# Patient Record
Sex: FEMALE | Race: WHITE | URBAN - METROPOLITAN AREA
[De-identification: names, ages, dates, MRNs, and addresses within clinical notes are randomized per-mention and may not be internally consistent; named-entity substitution may affect disease eponyms.]

---

## 2019-05-05 ENCOUNTER — EMERGENCY (EMERGENCY)
Facility: HOSPITAL | Age: 54
LOS: 0 days | Discharge: HOME | End: 2019-05-05
Attending: EMERGENCY MEDICINE | Admitting: EMERGENCY MEDICINE
Payer: MEDICARE

## 2019-05-05 ENCOUNTER — TRANSCRIPTION ENCOUNTER (OUTPATIENT)
Age: 54
End: 2019-05-05

## 2019-05-05 VITALS
TEMPERATURE: 98 F | HEART RATE: 68 BPM | SYSTOLIC BLOOD PRESSURE: 133 MMHG | RESPIRATION RATE: 16 BRPM | DIASTOLIC BLOOD PRESSURE: 64 MMHG | OXYGEN SATURATION: 100 %

## 2019-05-05 VITALS
SYSTOLIC BLOOD PRESSURE: 110 MMHG | DIASTOLIC BLOOD PRESSURE: 70 MMHG | HEART RATE: 64 BPM | RESPIRATION RATE: 17 BRPM | OXYGEN SATURATION: 99 % | TEMPERATURE: 98 F

## 2019-05-05 DIAGNOSIS — R14.0 ABDOMINAL DISTENSION (GASEOUS): ICD-10-CM

## 2019-05-05 DIAGNOSIS — Z90.49 ACQUIRED ABSENCE OF OTHER SPECIFIED PARTS OF DIGESTIVE TRACT: ICD-10-CM

## 2019-05-05 DIAGNOSIS — K59.00 CONSTIPATION, UNSPECIFIED: ICD-10-CM

## 2019-05-05 DIAGNOSIS — Z79.899 OTHER LONG TERM (CURRENT) DRUG THERAPY: ICD-10-CM

## 2019-05-05 DIAGNOSIS — Z90.49 ACQUIRED ABSENCE OF OTHER SPECIFIED PARTS OF DIGESTIVE TRACT: Chronic | ICD-10-CM

## 2019-05-05 DIAGNOSIS — R10.9 UNSPECIFIED ABDOMINAL PAIN: ICD-10-CM

## 2019-05-05 DIAGNOSIS — R10.32 LEFT LOWER QUADRANT PAIN: ICD-10-CM

## 2019-05-05 LAB
ALBUMIN SERPL ELPH-MCNC: 4.3 G/DL — SIGNIFICANT CHANGE UP (ref 3.5–5.2)
ALP SERPL-CCNC: 68 U/L — SIGNIFICANT CHANGE UP (ref 30–115)
ALT FLD-CCNC: 25 U/L — SIGNIFICANT CHANGE UP (ref 0–41)
ANION GAP SERPL CALC-SCNC: 11 MMOL/L — SIGNIFICANT CHANGE UP (ref 7–14)
APPEARANCE UR: CLEAR — SIGNIFICANT CHANGE UP
AST SERPL-CCNC: 27 U/L — SIGNIFICANT CHANGE UP (ref 0–41)
BASOPHILS # BLD AUTO: 0.04 K/UL — SIGNIFICANT CHANGE UP (ref 0–0.2)
BASOPHILS NFR BLD AUTO: 0.6 % — SIGNIFICANT CHANGE UP (ref 0–1)
BILIRUB SERPL-MCNC: 0.4 MG/DL — SIGNIFICANT CHANGE UP (ref 0.2–1.2)
BILIRUB UR-MCNC: NEGATIVE — SIGNIFICANT CHANGE UP
BUN SERPL-MCNC: 14 MG/DL — SIGNIFICANT CHANGE UP (ref 10–20)
CALCIUM SERPL-MCNC: 10.1 MG/DL — SIGNIFICANT CHANGE UP (ref 8.5–10.1)
CHLORIDE SERPL-SCNC: 102 MMOL/L — SIGNIFICANT CHANGE UP (ref 98–110)
CO2 SERPL-SCNC: 28 MMOL/L — SIGNIFICANT CHANGE UP (ref 17–32)
COLOR SPEC: YELLOW — SIGNIFICANT CHANGE UP
CREAT SERPL-MCNC: 0.9 MG/DL — SIGNIFICANT CHANGE UP (ref 0.7–1.5)
DIFF PNL FLD: ABNORMAL
EOSINOPHIL # BLD AUTO: 0.17 K/UL — SIGNIFICANT CHANGE UP (ref 0–0.7)
EOSINOPHIL NFR BLD AUTO: 2.6 % — SIGNIFICANT CHANGE UP (ref 0–8)
GLUCOSE SERPL-MCNC: 87 MG/DL — SIGNIFICANT CHANGE UP (ref 70–99)
GLUCOSE UR QL: NEGATIVE MG/DL — SIGNIFICANT CHANGE UP
HCT VFR BLD CALC: 40.2 % — SIGNIFICANT CHANGE UP (ref 37–47)
HGB BLD-MCNC: 13.5 G/DL — SIGNIFICANT CHANGE UP (ref 12–16)
IMM GRANULOCYTES NFR BLD AUTO: 0.3 % — SIGNIFICANT CHANGE UP (ref 0.1–0.3)
KETONES UR-MCNC: 15
LEUKOCYTE ESTERASE UR-ACNC: ABNORMAL
LIDOCAIN IGE QN: 43 U/L — SIGNIFICANT CHANGE UP (ref 7–60)
LYMPHOCYTES # BLD AUTO: 1.77 K/UL — SIGNIFICANT CHANGE UP (ref 1.2–3.4)
LYMPHOCYTES # BLD AUTO: 27.2 % — SIGNIFICANT CHANGE UP (ref 20.5–51.1)
MCHC RBC-ENTMCNC: 30.7 PG — SIGNIFICANT CHANGE UP (ref 27–31)
MCHC RBC-ENTMCNC: 33.6 G/DL — SIGNIFICANT CHANGE UP (ref 32–37)
MCV RBC AUTO: 91.4 FL — SIGNIFICANT CHANGE UP (ref 81–99)
MONOCYTES # BLD AUTO: 0.45 K/UL — SIGNIFICANT CHANGE UP (ref 0.1–0.6)
MONOCYTES NFR BLD AUTO: 6.9 % — SIGNIFICANT CHANGE UP (ref 1.7–9.3)
NEUTROPHILS # BLD AUTO: 4.05 K/UL — SIGNIFICANT CHANGE UP (ref 1.4–6.5)
NEUTROPHILS NFR BLD AUTO: 62.4 % — SIGNIFICANT CHANGE UP (ref 42.2–75.2)
NITRITE UR-MCNC: NEGATIVE — SIGNIFICANT CHANGE UP
NRBC # BLD: 0 /100 WBCS — SIGNIFICANT CHANGE UP (ref 0–0)
PH UR: 5.5 — SIGNIFICANT CHANGE UP (ref 5–8)
PLATELET # BLD AUTO: 203 K/UL — SIGNIFICANT CHANGE UP (ref 130–400)
POTASSIUM SERPL-MCNC: 4 MMOL/L — SIGNIFICANT CHANGE UP (ref 3.5–5)
POTASSIUM SERPL-SCNC: 4 MMOL/L — SIGNIFICANT CHANGE UP (ref 3.5–5)
PROT SERPL-MCNC: 6.9 G/DL — SIGNIFICANT CHANGE UP (ref 6–8)
PROT UR-MCNC: NEGATIVE MG/DL — SIGNIFICANT CHANGE UP
RBC # BLD: 4.4 M/UL — SIGNIFICANT CHANGE UP (ref 4.2–5.4)
RBC # FLD: 12.6 % — SIGNIFICANT CHANGE UP (ref 11.5–14.5)
SODIUM SERPL-SCNC: 141 MMOL/L — SIGNIFICANT CHANGE UP (ref 135–146)
SP GR SPEC: 1.02 — SIGNIFICANT CHANGE UP (ref 1.01–1.03)
UROBILINOGEN FLD QL: 0.2 MG/DL — SIGNIFICANT CHANGE UP (ref 0.2–0.2)
WBC # BLD: 6.5 K/UL — SIGNIFICANT CHANGE UP (ref 4.8–10.8)
WBC # FLD AUTO: 6.5 K/UL — SIGNIFICANT CHANGE UP (ref 4.8–10.8)

## 2019-05-05 PROCEDURE — 74177 CT ABD & PELVIS W/CONTRAST: CPT | Mod: 26

## 2019-05-05 PROCEDURE — 99284 EMERGENCY DEPT VISIT MOD MDM: CPT

## 2019-05-05 RX ORDER — IOHEXOL 300 MG/ML
30 INJECTION, SOLUTION INTRAVENOUS ONCE
Qty: 0 | Refills: 0 | Status: COMPLETED | OUTPATIENT
Start: 2019-05-05 | End: 2019-05-05

## 2019-05-05 RX ORDER — SODIUM CHLORIDE 9 MG/ML
1000 INJECTION, SOLUTION INTRAVENOUS ONCE
Qty: 0 | Refills: 0 | Status: COMPLETED | OUTPATIENT
Start: 2019-05-05 | End: 2019-05-05

## 2019-05-05 RX ORDER — MULTIVIT WITH MIN/MFOLATE/K2 340-15/3 G
1 POWDER (GRAM) ORAL ONCE
Qty: 0 | Refills: 0 | Status: COMPLETED | OUTPATIENT
Start: 2019-05-05 | End: 2019-05-05

## 2019-05-05 RX ADMIN — IOHEXOL 30 MILLILITER(S): 300 INJECTION, SOLUTION INTRAVENOUS at 17:15

## 2019-05-05 RX ADMIN — Medication 1 ENEMA: at 22:05

## 2019-05-05 RX ADMIN — SODIUM CHLORIDE 1000 MILLILITER(S): 9 INJECTION, SOLUTION INTRAVENOUS at 16:41

## 2019-05-05 RX ADMIN — Medication 1 BOTTLE: at 22:05

## 2019-05-05 RX ADMIN — SODIUM CHLORIDE 1000 MILLILITER(S): 9 INJECTION, SOLUTION INTRAVENOUS at 19:26

## 2019-05-05 NOTE — ED ADULT NURSE NOTE - NSIMPLEMENTINTERV_GEN_ALL_ED
Implemented All Universal Safety Interventions:  [place] to call system. Call bell, personal items and telephone within reach. Instruct patient to call for assistance. Room bathroom lighting operational. Non-slip footwear when patient is off stretcher. Physically safe environment: no spills, clutter or unnecessary equipment. Stretcher in lowest position, wheels locked, appropriate side rails in place.

## 2019-05-05 NOTE — ED PROVIDER NOTE - PHYSICAL EXAMINATION
Gen: NAD, AOx3  Head: NCAT  HEENT: PERRL, oral mucosa moist, normal conjunctiva, oropharynx clear without exudate or erythema  Lung: CTAB, no respiratory distress, no wheezing, rales, rhonchi  CV: normal s1/s2, rrr, Normal perfusion, pulses 2+ throughout  Abd: no CVA tenderness, distention, LLQ tendernes  Genitourinary: no pelvic tenderness  MSK: No edema, no visible deformities, full range of motion in all 4 extremities  Neuro: CN II-XII grossly intact, No focal neurologic deficits  Skin: No rash   Psych: normal affect

## 2019-05-05 NOTE — ED PROVIDER NOTE - NSFOLLOWUPINSTRUCTIONS_ED_ALL_ED_FT
Constipation, Adult  Constipation is when a person:  Poops (has a bowel movement) fewer times in a week than normal.  Has a hard time pooping.  Has poop that is dry, hard, or bigger than normal.  Follow these instructions at home:  Eating and drinking     Image   Eat foods that have a lot of fiber, such as:  Fresh fruits and vegetables.  Whole grains.  Beans.  Eat less of foods that are high in fat, low in fiber, or overly processed, such as:  French fries.  Hamburgers.  Cookies.  Candy.  Soda.  Drink enough fluid to keep your pee (urine) clear or pale yellow.  General instructions     Exercise regularly or as told by your doctor.  Go to the restroom when you feel like you need to poop. Do not hold it in.  Take over-the-counter and prescription medicines only as told by your doctor. These include any fiber supplements.  Do pelvic floor retraining exercises, such as:  Doing deep breathing while relaxing your lower belly (abdomen).  Relaxing your pelvic floor while pooping.  Watch your condition for any changes.  Keep all follow-up visits as told by your doctor. This is important.  Contact a doctor if:  You have pain that gets worse.  You have a fever.  You have not pooped for 4 days.  You throw up (vomit).  You are not hungry.  You lose weight.  You are bleeding from the anus.  You have thin, pencil-like poop (stool).  Get help right away if:  You have a fever, and your symptoms suddenly get worse.  You leak poop or have blood in your poop.  Your belly feels hard or bigger than normal (is bloated).  You have very bad belly pain.  You feel dizzy or you faint.  This information is not intended to replace advice given to you by your health care provider. Make sure you discuss any questions you have with your primary care physician and gastroenterologist.

## 2019-05-05 NOTE — ED PROVIDER NOTE - NS ED ROS FT
Constitutional: (-) fever  Eyes/ENT: (-) blurry vision, (-) epistaxis  Cardiovascular: (-) chest pain, (-) syncope  Respiratory: (-) cough, (-) shortness of breath  Gastrointestinal: (-) vomiting, (-) diarrhea, constipation, abdominal pain  Genitourinary: (-) dysuria, (-) hesitancy, (-) frequency   Musculoskeletal: (-) neck pain, (-) back pain, (-) joint pain  Integumentary: (-) rash, (-) edema  Neurological: (-) headache, (-) altered mental status  Allergic/Immunologic: (-) pruritus

## 2019-05-05 NOTE — ED PROVIDER NOTE - PROGRESS NOTE DETAILS
54yo F with a h/o MS and constipation comes in c.o abdominal pain with constipation x 10 days. Pt went to [facility] and was sent to the ED for further eval. At the [facility] an [place]R was done and it was noted showing multiple air fluid levels in the right colon. Pt states that she has taken multiple laxatives with no relief. PT denies any CP, SOB, nausea, vomiting, urinary symptoms. On exam: NCAT. PERRLA, EOMI. OP clear. Lungs CTAB. RRR, S1S2 noted. Radial pulses 2+ and equal, pedal pulses 2+ and equal. Abdomen soft, NT, (+) distention, no rebound or guarding. FROM x4 extremities. No focal neuro deficits. Plan: CT, labs, Fluids and reassess ct was unremarkable. will order enema and magnesium citrate. have pt f/u with her gastroenterologist. Patient to be discharged from ED. Any available test results were discussed with patient and/or family. Verbal instructions given, including instructions to return to ED immediately for any new, worsening, or concerning symptoms. Patient endorsed understanding. Written discharge instructions additionally given, including follow-up plan.

## 2019-05-05 NOTE — ED PROVIDER NOTE - ATTENDING CONTRIBUTION TO CARE
54yo F with a h/o MS and constipation comes in c.o abdominal pain with constipation x 10 days. Pt went to [facility] [place] and was sent to the ED for further eval. At the [facility] an XR was done and it was noted showing multiple air fluid levels in the right colon. Pt states that she has taken multiple laxatives with no relief. PT denies any CP, SOB, nausea, vomiting, urinary symptoms. On exam: NCAT. PERRLA, EOMI. OP clear. Lungs CTAB. RRR, S1S2 noted. Radial pulses 2+ and equal, pedal pulses 2+ and equal. Abdomen soft, NT, (+) distention, no rebound or guarding. FROM x4 extremities. No focal neuro deficits. Plan: CT, labs, Fluids and reassess

## 2019-05-05 NOTE — ED PROVIDER NOTE - OBJECTIVE STATEMENT
54 yo female with a pmh of MS present c/o constipation. pt states she has not been able to pass her bowels for 10 days. she has taken laxatives and suppository enemas with no relief. she has been experiencing bloating that has gradually worsened throughout the days and she started to have lower abdominal pain last night that radiates to her back. she describes the pain as pressure in nature and has not noted any aggravating or alleviating factors. she denies any other symptoms including recent illness/travel, fevers, chills, cough, chest pain, or SOB.

## 2019-05-19 ENCOUNTER — EMERGENCY (EMERGENCY)
Facility: HOSPITAL | Age: 54
LOS: 0 days | Discharge: HOME | End: 2019-05-19
Attending: EMERGENCY MEDICINE | Admitting: EMERGENCY MEDICINE
Payer: MEDICARE

## 2019-05-19 VITALS
OXYGEN SATURATION: 99 % | DIASTOLIC BLOOD PRESSURE: 62 MMHG | WEIGHT: 139.99 LBS | RESPIRATION RATE: 16 BRPM | SYSTOLIC BLOOD PRESSURE: 121 MMHG | TEMPERATURE: 98 F | HEART RATE: 55 BPM

## 2019-05-19 DIAGNOSIS — Z90.49 ACQUIRED ABSENCE OF OTHER SPECIFIED PARTS OF DIGESTIVE TRACT: Chronic | ICD-10-CM

## 2019-05-19 DIAGNOSIS — R10.9 UNSPECIFIED ABDOMINAL PAIN: ICD-10-CM

## 2019-05-19 DIAGNOSIS — K59.00 CONSTIPATION, UNSPECIFIED: ICD-10-CM

## 2019-05-19 DIAGNOSIS — Z79.899 OTHER LONG TERM (CURRENT) DRUG THERAPY: ICD-10-CM

## 2019-05-19 DIAGNOSIS — Z90.49 ACQUIRED ABSENCE OF OTHER SPECIFIED PARTS OF DIGESTIVE TRACT: ICD-10-CM

## 2019-05-19 LAB
ALBUMIN SERPL ELPH-MCNC: 4.2 G/DL — SIGNIFICANT CHANGE UP (ref 3.5–5.2)
ALP SERPL-CCNC: 62 U/L — SIGNIFICANT CHANGE UP (ref 30–115)
ALT FLD-CCNC: 30 U/L — SIGNIFICANT CHANGE UP (ref 0–41)
ANION GAP SERPL CALC-SCNC: 10 MMOL/L — SIGNIFICANT CHANGE UP (ref 7–14)
APPEARANCE UR: CLEAR — SIGNIFICANT CHANGE UP
AST SERPL-CCNC: 42 U/L — HIGH (ref 0–41)
BASOPHILS # BLD AUTO: 0.03 K/UL — SIGNIFICANT CHANGE UP (ref 0–0.2)
BASOPHILS NFR BLD AUTO: 0.4 % — SIGNIFICANT CHANGE UP (ref 0–1)
BILIRUB SERPL-MCNC: 0.3 MG/DL — SIGNIFICANT CHANGE UP (ref 0.2–1.2)
BILIRUB UR-MCNC: NEGATIVE — SIGNIFICANT CHANGE UP
BUN SERPL-MCNC: 15 MG/DL — SIGNIFICANT CHANGE UP (ref 10–20)
CALCIUM SERPL-MCNC: 9.7 MG/DL — SIGNIFICANT CHANGE UP (ref 8.5–10.1)
CHLORIDE SERPL-SCNC: 107 MMOL/L — SIGNIFICANT CHANGE UP (ref 98–110)
CO2 SERPL-SCNC: 28 MMOL/L — SIGNIFICANT CHANGE UP (ref 17–32)
COLOR SPEC: YELLOW — SIGNIFICANT CHANGE UP
CREAT SERPL-MCNC: 0.8 MG/DL — SIGNIFICANT CHANGE UP (ref 0.7–1.5)
DIFF PNL FLD: NEGATIVE — SIGNIFICANT CHANGE UP
EOSINOPHIL # BLD AUTO: 0.18 K/UL — SIGNIFICANT CHANGE UP (ref 0–0.7)
EOSINOPHIL NFR BLD AUTO: 2.6 % — SIGNIFICANT CHANGE UP (ref 0–8)
EPI CELLS # UR: ABNORMAL /HPF
GLUCOSE SERPL-MCNC: 94 MG/DL — SIGNIFICANT CHANGE UP (ref 70–99)
GLUCOSE UR QL: NEGATIVE MG/DL — SIGNIFICANT CHANGE UP
HCT VFR BLD CALC: 40.4 % — SIGNIFICANT CHANGE UP (ref 37–47)
HGB BLD-MCNC: 13.4 G/DL — SIGNIFICANT CHANGE UP (ref 12–16)
IMM GRANULOCYTES NFR BLD AUTO: 0.3 % — SIGNIFICANT CHANGE UP (ref 0.1–0.3)
KETONES UR-MCNC: ABNORMAL
LEUKOCYTE ESTERASE UR-ACNC: ABNORMAL
LIDOCAIN IGE QN: 47 U/L — SIGNIFICANT CHANGE UP (ref 7–60)
LYMPHOCYTES # BLD AUTO: 2.32 K/UL — SIGNIFICANT CHANGE UP (ref 1.2–3.4)
LYMPHOCYTES # BLD AUTO: 33.2 % — SIGNIFICANT CHANGE UP (ref 20.5–51.1)
MAGNESIUM SERPL-MCNC: 2.1 MG/DL — SIGNIFICANT CHANGE UP (ref 1.8–2.4)
MCHC RBC-ENTMCNC: 30.3 PG — SIGNIFICANT CHANGE UP (ref 27–31)
MCHC RBC-ENTMCNC: 33.2 G/DL — SIGNIFICANT CHANGE UP (ref 32–37)
MCV RBC AUTO: 91.4 FL — SIGNIFICANT CHANGE UP (ref 81–99)
MONOCYTES # BLD AUTO: 0.64 K/UL — HIGH (ref 0.1–0.6)
MONOCYTES NFR BLD AUTO: 9.2 % — SIGNIFICANT CHANGE UP (ref 1.7–9.3)
NEUTROPHILS # BLD AUTO: 3.79 K/UL — SIGNIFICANT CHANGE UP (ref 1.4–6.5)
NEUTROPHILS NFR BLD AUTO: 54.3 % — SIGNIFICANT CHANGE UP (ref 42.2–75.2)
NITRITE UR-MCNC: NEGATIVE — SIGNIFICANT CHANGE UP
NRBC # BLD: 0 /100 WBCS — SIGNIFICANT CHANGE UP (ref 0–0)
PH UR: 6 — SIGNIFICANT CHANGE UP (ref 5–8)
PLATELET # BLD AUTO: 225 K/UL — SIGNIFICANT CHANGE UP (ref 130–400)
POTASSIUM SERPL-MCNC: 4.4 MMOL/L — SIGNIFICANT CHANGE UP (ref 3.5–5)
POTASSIUM SERPL-SCNC: 4.4 MMOL/L — SIGNIFICANT CHANGE UP (ref 3.5–5)
PROT SERPL-MCNC: 7.1 G/DL — SIGNIFICANT CHANGE UP (ref 6–8)
PROT UR-MCNC: NEGATIVE MG/DL — SIGNIFICANT CHANGE UP
RBC # BLD: 4.42 M/UL — SIGNIFICANT CHANGE UP (ref 4.2–5.4)
RBC # FLD: 12.8 % — SIGNIFICANT CHANGE UP (ref 11.5–14.5)
RBC CASTS # UR COMP ASSIST: SIGNIFICANT CHANGE UP /HPF
SODIUM SERPL-SCNC: 145 MMOL/L — SIGNIFICANT CHANGE UP (ref 135–146)
SP GR SPEC: 1.02 — SIGNIFICANT CHANGE UP (ref 1.01–1.03)
UROBILINOGEN FLD QL: 0.2 MG/DL — SIGNIFICANT CHANGE UP (ref 0.2–0.2)
WBC # BLD: 6.98 K/UL — SIGNIFICANT CHANGE UP (ref 4.8–10.8)
WBC # FLD AUTO: 6.98 K/UL — SIGNIFICANT CHANGE UP (ref 4.8–10.8)
WBC UR QL: ABNORMAL /HPF

## 2019-05-19 PROCEDURE — 74177 CT ABD & PELVIS W/CONTRAST: CPT | Mod: 26

## 2019-05-19 PROCEDURE — 99284 EMERGENCY DEPT VISIT MOD MDM: CPT

## 2019-05-19 RX ORDER — SODIUM CHLORIDE 9 MG/ML
1000 INJECTION INTRAMUSCULAR; INTRAVENOUS; SUBCUTANEOUS ONCE
Refills: 0 | Status: COMPLETED | OUTPATIENT
Start: 2019-05-19 | End: 2019-05-19

## 2019-05-19 RX ORDER — IOHEXOL 300 MG/ML
30 INJECTION, SOLUTION INTRAVENOUS ONCE
Refills: 0 | Status: COMPLETED | OUTPATIENT
Start: 2019-05-19 | End: 2019-05-19

## 2019-05-19 RX ORDER — ONDANSETRON 8 MG/1
4 TABLET, FILM COATED ORAL ONCE
Refills: 0 | Status: COMPLETED | OUTPATIENT
Start: 2019-05-19 | End: 2019-05-19

## 2019-05-19 RX ADMIN — IOHEXOL 30 MILLILITER(S): 300 INJECTION, SOLUTION INTRAVENOUS at 19:27

## 2019-05-19 RX ADMIN — ONDANSETRON 4 MILLIGRAM(S): 8 TABLET, FILM COATED ORAL at 21:28

## 2019-05-19 RX ADMIN — SODIUM CHLORIDE 2000 MILLILITER(S): 9 INJECTION INTRAMUSCULAR; INTRAVENOUS; SUBCUTANEOUS at 19:19

## 2019-05-19 NOTE — ED PROVIDER NOTE - OBJECTIVE STATEMENT
54 y/o female with hx of MS , chronic constipation, taking laxatives daily x years presents with constipation . patient stopped taking her laxatives 3 weeks ago. patient with abdominal distention and cramping 52 y/o female with hx of MS , chronic constipation, taking laxatives daily x years presents with constipation . patient stopped taking her laxatives 3 weeks ago. patient with abdominal distention and cramping. pt seen in the Ed 2 weeks ago, had ct scn and given laxtives. patient states she took 3 enemas today and only had small bowel movement. patient c/o pressure in central abdomen radiating to back

## 2019-05-19 NOTE — ED PROVIDER NOTE - PROGRESS NOTE DETAILS
54yo F with a h/o MS presents with constipation x 3 weeks. Pt was recently seen in the ER for the same symptoms and states that her constipation has not gotten better. Pt state sthat she has been taking multiple different laxatives with no relief of symptoms. On exam: NCAT. PERRLA, EOMI. OP clear. Lungs CTAB. RRR, S1S2 noted. Radial pulses 2+ and equal, pedal pulses 2+ and equal. Abdomen soft, NT, (+) distention, no rebound or guarding. FROM x4 extremities. No focal neuro deficits.

## 2019-05-19 NOTE — ED ADULT TRIAGE NOTE - CHIEF COMPLAINT QUOTE
"I feel bloated. I am constipated. I am passing very small pieces of stool." Pt states she has tried numerous OTC remedies (suppositories, mineral oil, three to four enemas per day) with no relief. Pt seen here two weeks ago for similar symptoms.

## 2019-05-19 NOTE — ED PROVIDER NOTE - NS ED ROS FT
Review of Systems    Constitutional: (-) fever/ chills (-) weight loss  Eyes/ENT: (-) blurry vision  Cardiovascular: (-) chest pain, (-) syncope (-) palpitations  Respiratory: (-) cough, (-) shortness of breath  Gastrointestinal: (-) vomiting, (-) diarrhea (+) abdominal pain and distention  Musculoskeletal: (-) neck pain, (-) back pain,   Integumentary: (-) rash, (-) swelling  Neurological: (-) headache,   Psychiatric: (-) hallucinations or depression   Allergic/Immunologic: (-) pruritus

## 2019-05-19 NOTE — ED PROVIDER NOTE - ATTENDING CONTRIBUTION TO CARE
I was present for and supervised the key and critical aspects of the procedures performed during the care of the patient. 54yo F with a h/o MS presents with constipation x 3 weeks. Pt was recently seen in the ER for the same symptoms and states that her constipation has not gotten better. Pt state sthat she has been taking multiple different laxatives with no relief of symptoms. On exam: NCAT. PERRLA, EOMI. OP clear. Lungs CTAB. RRR, S1S2 noted. Radial pulses 2+ and equal, pedal pulses 2+ and equal. Abdomen soft, NT, (+) distention, no rebound or guarding. FROM x4 extremities. No focal neuro deficits.

## 2019-05-19 NOTE — ED PROVIDER NOTE - CLINICAL SUMMARY MEDICAL DECISION MAKING FREE TEXT BOX
Patient improved we obtained labs, ct abd pelvis which is negative I will discharge at this time follow up 5/30

## 2019-05-20 VITALS
DIASTOLIC BLOOD PRESSURE: 60 MMHG | SYSTOLIC BLOOD PRESSURE: 119 MMHG | OXYGEN SATURATION: 99 % | HEART RATE: 64 BPM | RESPIRATION RATE: 16 BRPM

## 2019-05-20 PROBLEM — G35 MULTIPLE SCLEROSIS: Chronic | Status: ACTIVE | Noted: 2019-05-05

## 2019-05-20 PROBLEM — K59.00 CONSTIPATION, UNSPECIFIED: Chronic | Status: ACTIVE | Noted: 2019-05-05

## 2019-08-05 ENCOUNTER — APPOINTMENT (OUTPATIENT)
Dept: NEUROLOGY | Facility: CLINIC | Age: 54
End: 2019-08-05
Payer: MEDICARE

## 2019-08-05 VITALS
HEIGHT: 66 IN | OXYGEN SATURATION: 98 % | WEIGHT: 151 LBS | DIASTOLIC BLOOD PRESSURE: 63 MMHG | BODY MASS INDEX: 24.27 KG/M2 | SYSTOLIC BLOOD PRESSURE: 112 MMHG | HEART RATE: 64 BPM

## 2019-08-05 PROCEDURE — 99214 OFFICE O/P EST MOD 30 MIN: CPT

## 2019-08-05 RX ORDER — LEVOTHYROXINE SODIUM 150 UG/1
150 TABLET ORAL DAILY
Refills: 0 | Status: ACTIVE | COMMUNITY

## 2019-08-05 RX ORDER — LORAZEPAM 1 MG/1
1 TABLET ORAL
Refills: 0 | Status: ACTIVE | COMMUNITY

## 2019-08-05 NOTE — REVIEW OF SYSTEMS
[Memory Lapses or Loss] : memory loss [Decr. Concentrating Ability] : decreased concentrating ability [Difficulty with Language] : ~M difficulty with language [Abdominal Pain] : abdominal pain [Constipation] : constipation [Negative] : Heme/Lymph

## 2019-08-05 NOTE — HISTORY OF PRESENT ILLNESS
[FreeTextEntry1] : Ms. Fraser is a 54-year-old woman last seen by me on 2/6/2019 for follow-up of multiple sclerosis.  Her issue back then mostly was related to nausea, vomiting, and headaches.  We did an MRI of the brain and cervical spine.  MRI of the cervical spine showed very mild minimal radicular changes given some improvement compared to prior MRI.  MRI of the brain showed white matter lesions consistent with her diagnosis of MS.  She has been intermittent taking her Paxil and sometimes will take everyday sometimes she will miss it up to week or two.  She says when she takes it makes her feel sick and achy and then when she stops taking it for a few days she feels much better.  She has had no other new complaints other than the headaches, which are occurring between 15-20 days a month and she was using Fioricet, which appears to help; however, she believes this making her also feel more anxious than she usually is.  She takes lorazepam p.r.n. for anxiety; however, she had to use more frequently and she believes she is lot more anxious than in past.  \par She believes she is depressed and anxious and her family has been noticing some issues with her memory and her language.  She has also been having some issues in which she cannot go to the bathroom for sometimes 2 weeks at a time.  She is scheduled to have a colonoscopy and I ordered an MRI cervical and thoracic spine to make sure there are no new lesions.  She missed her MRI appointment.

## 2019-08-05 NOTE — DISCUSSION/SUMMARY
[FreeTextEntry1] : Ms. Fraser is a 53yo woman with MS currently with migraines some cognitive complaints which I believe maybe related to her depression and anxiety and constipation issues being worked up by GI and by MRI of the spine to look for any new lesions\par 1. MRI c+T spine under sedation\par 2. Neuropsych testing\par 3. Thyroid antibodies and repeat thyroid panel\par 4. Continue copaxone daily\par 5. She will consider occrevus \par 6. f/u in 6 months

## 2019-08-05 NOTE — PHYSICAL EXAM
[FreeTextEntry1] : Orientation: oriented to person, oriented to place and oriented to time. \par Attention: normal concentrating ability and visual attention was not decreased. \par Language: no difficulty naming common objects, no difficulty repeating a phrase, no difficulty writing a sentence, fluency intact, comprehension intact and reading intact. \par Fund of knowledge: displays adequate knowledge of personal past history. \par Cranial Nerves: visual acuity intact bilaterally, visual fields full to confrontation, pupils equal round and reactive to light, extraocular motion intact, facial sensation intact symmetrically, face symmetrical, hearing was intact bilaterally, tongue and palate midline, head turning and shoulder shrug symmetric and there was no tongue deviation with protrusion. \par Motor: muscle tone was normal in all four extremities, muscle strength was normal in all four extremities and normal bulk in all four extremities. \par Sensory exam: light touch, PP, Vibration was intact. Except decrease in lue to Temp and vib\par Coordination:. normal gait. balance was intact. there was no past-pointing. no tremor present. \par Deep tendon reflexes: \par 2+ in RUE and 1+ in LUE 1+ in RLE and 0 in LLE\par Plantar responses normal on the right, normal on the left.  \par

## 2019-09-18 ENCOUNTER — OUTPATIENT (OUTPATIENT)
Dept: OUTPATIENT SERVICES | Facility: HOSPITAL | Age: 54
LOS: 1 days | Discharge: HOME | End: 2019-09-18

## 2019-09-18 DIAGNOSIS — Z90.49 ACQUIRED ABSENCE OF OTHER SPECIFIED PARTS OF DIGESTIVE TRACT: Chronic | ICD-10-CM

## 2019-09-18 DIAGNOSIS — G35 MULTIPLE SCLEROSIS: ICD-10-CM

## 2019-09-18 DIAGNOSIS — G31.84 MILD COGNITIVE IMPAIRMENT OF UNCERTAIN OR UNKNOWN ETIOLOGY: ICD-10-CM

## 2019-10-24 ENCOUNTER — APPOINTMENT (OUTPATIENT)
Dept: BREAST CENTER | Facility: CLINIC | Age: 54
End: 2019-10-24
Payer: MEDICARE

## 2019-10-24 VITALS
DIASTOLIC BLOOD PRESSURE: 80 MMHG | WEIGHT: 151 LBS | TEMPERATURE: 98.5 F | HEIGHT: 66 IN | BODY MASS INDEX: 24.27 KG/M2 | SYSTOLIC BLOOD PRESSURE: 118 MMHG

## 2019-10-24 PROCEDURE — 99203 OFFICE O/P NEW LOW 30 MIN: CPT

## 2019-10-24 NOTE — HISTORY OF PRESENT ILLNESS
[FreeTextEntry1] : Vika is a 54 F who presents with right breast pain. \par \par She has had right breast pain in the retroareolar area for the past month.  When she first developed her pain, she was seen by her physician who thought that she had mastitis and she was given a 1 week course of Keflex antibiotics.  She did not have any changes to her pain with the antibiotics.  She denies any redness or induration in that area.  She has not palpated any abnormal masses in either breast and denies any nipple discharge or retraction.  She denies any trauma to her breast and does not think that her implants have ruptured. \par \par Of note, she had her saline implants placed in  and has not had any issues since getting them placed. \par \par She stopped HRT 1.5 yrs prior, but has been on other treatments to treat her menopausal symptoms including weight gain, hot flashes and vaginal dryness.  Most recently, she started an estrogen tablet insertion 4 weeks prior with minimal improvement in her symptoms. \par \par Her most recent imaging was a b/l screening mammogram and US on 19 which revealed no suspicious abnormalities in either breast, deemed BIRADS 1. \par \par HISTORICAL RISK FACTORS: \par -no prior breast biopsies or surgeries \par -no family history of breast or ovarian cancer \par -, age at first live birth was 25\par -prior OCP use in past x 1 year, stopped 30 years prior \par -was on HRT x 1 year, stopped 1.5 years prior  \par -no gyn surgeries\par

## 2019-10-24 NOTE — PAST MEDICAL HISTORY
[Menarche Age ____] : age at menarche was [unfilled] [Menopause Age____] : age at menopause was [unfilled] [History of Hormone Replacement Treatment] : has a history of hormone replacement treatment [Total Preg ___] : G[unfilled] [Live Births ___] : P[unfilled]  [Age At Live Birth ___] : Age at live birth: [unfilled] [FreeTextEntry5] : denies  [FreeTextEntry6] : denies [FreeTextEntry8] : yes x 3 months  [FreeTextEntry7] : yes in past x 1 year, stopped 30 years prior

## 2019-10-24 NOTE — REVIEW OF SYSTEMS
[Breast Pain] : breast pain [As Noted in HPI] : as noted in HPI [Hot Flashes] : hot flashes [Negative] : Heme/Lymph [Abn Vaginal Bleeding] : no unexplained vaginal bleeding [Skin Lesions] : no skin lesions [Breast Lump] : no breast lump [Skin Wound] : no skin wound

## 2019-10-24 NOTE — PHYSICAL EXAM
[No dominant masses] : no dominant masses in right breast  [No dominant masses] : no dominant masses left breast [No Nipple Retraction] : no left nipple retraction [No Nipple Discharge] : no left nipple discharge [No Axillary Lymphadenopathy] : no right axillary lymphadenopathy [Normocephalic] : normocephalic [Atraumatic] : atraumatic [EOMI] : extra ocular movement intact [No Supraclavicular Adenopathy] : no supraclavicular adenopathy [No Cervical Adenopathy] : no cervical adenopathy [Examined in the supine and seated position] : examined in the supine and seated position [Symmetrical] : symmetrical [Soft] : abdomen soft [Not Tender] : non-tender [No Rashes] : no rashes [No Edema] : no edema [No Ulceration] : no ulceration [de-identified] : bilateral implants in place, no evidence of rupture or capsular contraction, no suspicious masses palpated in either breast, no evidence of infection in either breast

## 2019-10-24 NOTE — DATA REVIEWED
[FreeTextEntry1] : \par IMPRESSION:\par \par There is no sonographic evidence of malignancy. Further management of breast pain should be determined on clinical grounds. Patient was advised to follow up with her clinician regarding additional management.\par \par Clinical correlation of the right breast(s) is recommended. A negative letter with history will be sent to the patient.\par \par BI-RADS 1: NEGATIVE\par \par US BREAST COMPLETE RIGHT\par \par Last chemical breast exam: Within the past year\par \par Clinical indication: Right breast pain for approximately one month, constant and diffuse, especially during palpation. During further discussion with the patient, a breast mass is not felt, however she feels stiffness of the tissue when squeezing the breast at the 3 and 9:00 axis.\par \par Comparison: Right breast mammogram on 9/16/2019. Ultrasound on 9/16/2019, 9/13/2018.\par \par Ultrasound evaluation was performed including examination of all four quadrants of the breast(s) and the retroareolar region(s).\par \par No suspicious abnormalities were seen sonographically in either breast. I examined and scanned the patient myself after initial imaging by the technologist. Breast implant is noted.\par \par Electronic Signature: I personally reviewed the images and agree with this report. Final Report: Dictated by and Signed by Attending Cassandra Mendoza MD 10/17/2019 12:38 PM\par \par \par \par OVERALL IMPRESSION: OVERALL BI-RADS 1: NEGATIVE\par \par There is no mammographic or sonographic evidence of malignancy.\par \par A 1 year screening mammogram of both breast(s) is recommended. The patient will be sent a normal letter.\par \par MAMMO TOMOSYNTHESIS SCREENING WITH IMPLANTS BILATERAL, US BREAST COMPLETE BILATERAL\par \par \par \par Clinical Indication: Routine screening. No family history of breast cancer.\par \par Compared to: 09/13/2018, 06/26/2017, 06/20/2016, and 11/12/2014 (accession [id number]), Compared to: 09/13/2018, 07/03/2017, and 11/12/2014 (accession [id number])\par \par MAMMOGRAM:\par \par Tomosynthesis and 2D imaging of the breast(s) were performed. Current study was also evaluated with a computer aided detection (CAD) system. Additional implant displaced views were obtained.\par \par Breast density: There are scattered areas of fibroglandular density.\par \par No significant masses, calcifications, or other findings are seen in either breast.\par \par Mammo BI-RADS 1: NEGATIVE\par \par \par US BREAST COMPLETE BILATERAL\par \par Ultrasound evaluation was performed including examination of all four quadrants of the breast(s) and the retroareolar region(s).\par \par No suspicious abnormalities were seen sonographically in either breast.\par \par Ultrasound BI-RADS 1: NEGATIVE\par \par \par Electronic Signature: I personally reviewed the images and agree with this report. Final Report: Dictated by and Signed by Attending Jennifer Hickey MD 9/17/2019 5:29 PM\par \par

## 2019-10-24 NOTE — ASSESSMENT
[FreeTextEntry1] : Vika is a 54 postmenopausal F with right breast pain. \par \par ON exam, there was no suspicious abnormalities palpated in either breast.  THere was no signs of infection and no signs of capsular contracture or implant rupture on exam either.  Her most recent imaging was a b/l screening mammogram and US on 9/16/19 which was also unrevealing for any suspicious lesions.  She will be due for her next screening mammogram and US on 9/16/2020.  This will be scheduled for her today.  I will have her follow up after for a CBE. \par \par In regards to her breast pain, it may be related to fibrocystic changes within her breast that are hormonally influenced. We spoke about possible interventions including evening primrose oil, supportive bras, and decreasing caffeine intake.  Although none of these have been consistently proven to improve breast pain, they may be tried.  If the pain becomes very severe, there have been studies of tamoxifen being effective for the treatment of breast pain, although there are risks with tamoxifen.  Her pain may have been incited with the estrogen tablet, although it is unclear how much of this gets absorbed systemically.  I have recommended that she also try and stop the tablet and see if her symptoms resolve as she is not gaining much benefit from the treatment.  She is agreeable.  \par \par We discussed dense breasts.  Increasing breast density has been found to increase ones risk of breast cancer, but at this time, there is no clear indication for additional imaging in this setting, as both US and MRI have not been found to improve survival.  One can consider bilateral screening US.  However, out of 1000 women screened, the use of routine US will only identify an additional 3-5 cancers.  The use of US was found to increase the likelihood of undergoing more imaging and more biopsies.  She does NOT have dense breasts.  We have decided NOT to proceed with screening bilateral breast US at this time.\par \par She otherwise is interested in exchanging her implants/breast mammaplasty and a referral for plastic surgery was made (Dr. Olmstead).\par \par All of her questions were answered.  She knows to call with any further questions or concerns. \par \par PLAN: \par -plastic surgery referral \par -b/l screening mammogram in 1 year (9/16/2020), follow up after

## 2020-03-17 NOTE — ED ADULT TRIAGE NOTE - BP NONINVASIVE SYSTOLIC (MM HG)
Called and spoke to Lorrie.  She states she hasn't received he mail order prescription.  She states she was told to have our office contact the pharmacy.    [facility]  They state they need patient to call.     Called Lorrie and updated her.     110

## 2020-11-27 ENCOUNTER — TRANSCRIPTION ENCOUNTER (OUTPATIENT)
Age: 55
End: 2020-11-27

## 2020-12-16 ENCOUNTER — APPOINTMENT (OUTPATIENT)
Dept: BREAST CENTER | Facility: CLINIC | Age: 55
End: 2020-12-16
Payer: MEDICARE

## 2020-12-16 VITALS
TEMPERATURE: 97.2 F | DIASTOLIC BLOOD PRESSURE: 70 MMHG | WEIGHT: 151 LBS | BODY MASS INDEX: 24.27 KG/M2 | SYSTOLIC BLOOD PRESSURE: 122 MMHG | HEIGHT: 66 IN

## 2020-12-16 PROCEDURE — 99213 OFFICE O/P EST LOW 20 MIN: CPT

## 2020-12-16 NOTE — ASSESSMENT
[FreeTextEntry1] : Vika is a 55 postmenopausal F with right breast pain. \par \par ON exam, there was no suspicious abnormalities palpated in either breast.  THere was no signs of infection and no signs of capsular contracture or implant rupture on exam either. \par \par Her most recent imaging was a b/l dx mammogram and US on 11/3/2020 which was unrevealing for any suspicious abnormalities, deemed BIRADS 1. \par \par She will be due for her next screening mammogram and US on 11/3/2021.  This will be scheduled for her today.  I will have her follow up after for a CBE. \par \par In regards to her breast pain, it may be related to fibrocystic changes within her breast that are hormonally influenced. We spoke about possible interventions including evening primrose oil, supportive bras, and decreasing caffeine intake.  Although none of these have been consistently proven to improve breast pain, they may be tried.  If the pain becomes very severe, there have been studies of tamoxifen being effective for the treatment of breast pain, although there are risks with tamoxifen.  Her pain may have been incited with the estrogen tablet, although it is unclear how much of this gets absorbed systemically.  I have recommended that she also try and stop the tablet and see if her symptoms resolve as she is not gaining much benefit from the treatment.  She is agreeable.  \par \par We discussed dense breasts.  Increasing breast density has been found to increase ones risk of breast cancer, but at this time, there is no clear indication for additional imaging in this setting, as both US and MRI have not been found to improve survival.  One can consider bilateral screening US.  However, out of 1000 women screened, the use of routine US will only identify an additional 3-5 cancers.  The use of US was found to increase the likelihood of undergoing more imaging and more biopsies.  She does NOT have dense breasts.  We have decided NOT to proceed with screening bilateral breast US at this time.\par \par She otherwise is interested in exchanging her implants/breast mammaplasty and a referral for plastic surgery was made (Dr. Olmstead).\par \par All of her questions were answered.  She knows to call with any further questions or concerns. \par \par PLAN: \par -plastic surgery referral \par -b/l screening mammogram in 1 year (11/3/2021), follow up after

## 2020-12-16 NOTE — REVIEW OF SYSTEMS
[Breast Pain] : breast pain [As Noted in HPI] : as noted in HPI [Hot Flashes] : hot flashes [Negative] : Heme/Lymph [Abn Vaginal Bleeding] : no unexplained vaginal bleeding [Skin Lesions] : no skin lesions [Skin Wound] : no skin wound [Breast Lump] : no breast lump

## 2020-12-16 NOTE — DATA REVIEWED
[FreeTextEntry1] : MAMMO TOMOSYNTHESIS DIAGNOSTIC BILATERAL, US BREAST\par  \par Clinical Breast Exam: Patient does report clinical breast exam in the last year.\par  \par Clinical Indication: No family history of breast cancer. Patient complains of focal right breast pain.\par  \par Compared to: 09/16/2019, 09/13/2018, 06/26/2017, and 06/20/2016\par  \par MAMMOGRAM: \par  \par Tomosynthesis and 2D imaging of the breast(s) were performed.  Current study was also evaluated with a computer aided detection (CAD) system.\par  \par Breast Density: There are scattered areas of fibroglandular density.\par  \par No significant masses, calcifications, or other findings are seen in either breast. There are bilateral subpectoralis saline implants.\par  \par US BREAST COMPLETE BILATERAL\par  \par Ultrasound evaluation was performed including examination of all four quadrants of the breast(s) and the retroareolar regions.\par  \par Color flow, Gray scale and real-time ultrasound of both breasts was performed. \par  \par No suspicious abnormalities were seen sonographically in either breast. \par  \par Electronic Signature: I personally reviewed the images and agree with this report. Final Report: Dictated by  and Signed by Attending Bonnie Frank MD 11/3/2020 11:34 AM\par  \par OVERALL IMPRESSION: \par  \par There is no mammographic or sonographic evidence of malignancy. Further evaluation of pain should be based on clinical grounds.\par  \par A 1 year screening mammogram of both breast(s) is recommended. A negative letter with history will be sent to the patient.\par  \par  \par BI-RADS 1: NEGATIVE

## 2020-12-16 NOTE — PAST MEDICAL HISTORY
[Menarche Age ____] : age at menarche was [unfilled] [Menopause Age____] : age at menopause was [unfilled] [History of Hormone Replacement Treatment] : has a history of hormone replacement treatment [Total Preg ___] : G[unfilled] [Live Births ___] : P[unfilled]  [Age At Live Birth ___] : Age at live birth: [unfilled] [FreeTextEntry5] : denies  [FreeTextEntry6] : denies [FreeTextEntry7] : yes in past x 1 year, stopped 30 years prior  [FreeTextEntry8] : yes x 3 months

## 2020-12-16 NOTE — PHYSICAL EXAM
[Normocephalic] : normocephalic [Atraumatic] : atraumatic [EOMI] : extra ocular movement intact [No Supraclavicular Adenopathy] : no supraclavicular adenopathy [No Cervical Adenopathy] : no cervical adenopathy [Examined in the supine and seated position] : examined in the supine and seated position [Symmetrical] : symmetrical [No dominant masses] : no dominant masses in right breast  [No dominant masses] : no dominant masses left breast [No Nipple Retraction] : no left nipple retraction [No Nipple Discharge] : no left nipple discharge [No Axillary Lymphadenopathy] : no left axillary lymphadenopathy [Soft] : abdomen soft [Not Tender] : non-tender [No Edema] : no edema [No Rashes] : no rashes [No Ulceration] : no ulceration [de-identified] : bilateral implants in place, no evidence of rupture or capsular contraction, no suspicious masses palpated in either breast, no evidence of infection in either breast

## 2020-12-16 NOTE — HISTORY OF PRESENT ILLNESS
[FreeTextEntry1] : Vika is a 55 F who presents with right breast pain. \par \par She has had right breast pain in the retroareolar area for the past month.  When she first developed her pain, she was seen by her physician who thought that she had mastitis and she was given a 1 week course of Keflex antibiotics.  She did not have any changes to her pain with the antibiotics.  She denies any redness or induration in that area.  She has not palpated any abnormal masses in either breast and denies any nipple discharge or retraction.  She denies any trauma to her breast and does not think that her implants have ruptured. \par \par Of note, she had her saline implants placed in  and has not had any issues since getting them placed. \par \par She stopped HRT 1.5 yrs prior, but has been on other treatments to treat her menopausal symptoms including weight gain, hot flashes and vaginal dryness.  Most recently, she started an estrogen tablet insertion 4 weeks prior with minimal improvement in her symptoms. \par \par Her most recent imaging was a b/l screening mammogram and US on 19 which revealed no suspicious abnormalities in either breast, deemed BIRADS 1. \par \par HISTORICAL RISK FACTORS: \par -no prior breast biopsies or surgeries \par -no family history of breast or ovarian cancer \par -, age at first live birth was 25\par -prior OCP use in past x 1 year, stopped 30 years prior \par -was on HRT x 1 year, stopped 1.5 years prior  \par -no gyn surgeries\par \par INTERVAL HISTORY: \par Vika returns for a 1 year follow up for breast pain.  She continues to have right breast pain in her superior areolar area and feels like a nodularity there. \par \par Her gynecologist continues to prescribe her antibiotics for the pain, however there is no change to her pain when taking the antibiotics.  \par \par SHe otherwise denies any left sided breast complaints and denies any nipple discharge or retraction.  She is still interested in removing her breast implants.  \par \par Her most recent imaging was a b/l dx mammogram and US on 11/3/2020 which was unrevealing for any suspicious abnormalities, deemed BIRADS 1.

## 2021-01-07 ENCOUNTER — APPOINTMENT (OUTPATIENT)
Dept: NEUROLOGY | Facility: CLINIC | Age: 56
End: 2021-01-07

## 2021-06-30 ENCOUNTER — APPOINTMENT (OUTPATIENT)
Dept: NEUROLOGY | Facility: CLINIC | Age: 56
End: 2021-06-30
Payer: MEDICARE

## 2021-06-30 VITALS
DIASTOLIC BLOOD PRESSURE: 61 MMHG | OXYGEN SATURATION: 98 % | HEART RATE: 70 BPM | TEMPERATURE: 97.6 F | HEIGHT: 66 IN | BODY MASS INDEX: 25.23 KG/M2 | SYSTOLIC BLOOD PRESSURE: 119 MMHG | WEIGHT: 157 LBS

## 2021-06-30 PROCEDURE — 99213 OFFICE O/P EST LOW 20 MIN: CPT

## 2021-06-30 NOTE — DISCUSSION/SUMMARY
[FreeTextEntry1] : Ms. Fraser is a 56yo woman with MS with new complaints of swallowing difficulties.  Possibly related to MS vs. esophageal issues\par 1. MRI brain and cervical spine w/o\par 2. Speech and swallow eval\par 3. GI followup for EGD\par 4. f/u in 3-4 months

## 2021-06-30 NOTE — PHYSICAL EXAM
[FreeTextEntry1] : Orientation: oriented to person, oriented to place and oriented to time. \par Attention: normal concentrating ability and visual attention was not decreased. \par Language: no difficulty naming common objects, no difficulty repeating a phrase, no difficulty writing a sentence, fluency intact, comprehension intact and reading intact. \par Fund of knowledge: displays adequate knowledge of personal past history. \par Cranial Nerves: visual acuity intact bilaterally, visual fields full to confrontation, pupils equal round and reactive to light, extraocular motion intact, facial sensation intact symmetrically, face symmetrical, hearing was intact bilaterally, tongue and palate midline, head turning and shoulder shrug symmetric and there was no tongue deviation with protrusion. \par Motor: muscle tone was normal in all four extremities, muscle strength was normal in all four extremities and normal bulk in all four extremities. Except right hip flexor is 4+/5\par Sensory exam: light touch, PP, Vibration was intact. Except decrease in lue to Temp and vib\par Coordination:. normal gait. balance was intact. there was no past-pointing. no tremor present. \par Deep tendon reflexes: \par 2+ in RUE and 1+ in LUE 1+ in RLE and 0 in LLE\par Plantar responses normal on the right, normal on the left.  \par

## 2021-06-30 NOTE — HISTORY OF PRESENT ILLNESS
[FreeTextEntry1] : Ms. Fraser is a 55-year-old woman last seen by me on 8/5/2019 for follow-up of multiple sclerosis.  Her issue back then mostly was related to nausea, vomiting, and headaches.  We did an MRI of the brain and cervical spine.  MRI of the cervical spine showed very mild minimal radicular changes given some improvement compared to prior MRI.  MRI of the brain showed white matter lesions consistent with her diagnosis of MS.  She has been intermittent taking her Paxil and sometimes will take everyday sometimes she will miss it up to week or two.  She says when she takes it makes her feel sick and achy and then when she stops taking it for a few days she feels much better.  She has had no other new complaints other than the headaches, which are occurring between 15-20 days a month and she was using Fioricet, which appears to help; however, she believes this making her also feel more anxious than she usually is.  She takes lorazepam p.r.n. for anxiety; however, she had to use more frequently and she believes she is lot more anxious than in past.  \par She believes she is depressed and anxious and her family has been noticing some issues with her memory and her language.  She has also been having some issues in which she cannot go to the bathroom for sometimes 2 weeks at a time.  She is scheduled to have a colonoscopy and I ordered an MRI cervical and thoracic spine to make sure there are no new lesions.  She missed her MRI appointment.\par \par She is here today with a new complaint of problems swallowing for the last 2 months.  She wakes up with feeling of something stuck in her throat.  Sometimes she has difficulty wallowing even yogurt.  She also has depression and anxiety and wants to try CBS to see if it helps.\par Migraines have been well controlled recently

## 2021-08-06 ENCOUNTER — APPOINTMENT (OUTPATIENT)
Dept: CARDIOLOGY | Facility: CLINIC | Age: 56
End: 2021-08-06
Payer: MEDICARE

## 2021-08-06 VITALS
HEIGHT: 66 IN | DIASTOLIC BLOOD PRESSURE: 64 MMHG | BODY MASS INDEX: 23.95 KG/M2 | WEIGHT: 149 LBS | SYSTOLIC BLOOD PRESSURE: 120 MMHG

## 2021-08-06 DIAGNOSIS — Z86.39 PERSONAL HISTORY OF OTHER ENDOCRINE, NUTRITIONAL AND METABOLIC DISEASE: ICD-10-CM

## 2021-08-06 DIAGNOSIS — Z78.9 OTHER SPECIFIED HEALTH STATUS: ICD-10-CM

## 2021-08-06 PROCEDURE — 93000 ELECTROCARDIOGRAM COMPLETE: CPT | Mod: 59

## 2021-08-06 PROCEDURE — 99214 OFFICE O/P EST MOD 30 MIN: CPT

## 2021-08-06 RX ORDER — BUTALBITAL, ACETAMINOPHEN AND CAFFEINE 300; 50; 40 MG/1; MG/1; MG/1
50-300-40 CAPSULE ORAL
Qty: 60 | Refills: 3 | Status: DISCONTINUED | COMMUNITY
Start: 2020-12-15 | End: 2021-08-06

## 2021-08-09 PROBLEM — Z78.9 NON-SMOKER: Status: RESOLVED | Noted: 2019-08-05 | Resolved: 2021-08-09

## 2021-08-09 NOTE — PHYSICAL EXAM
[Well Developed] : well developed [Normal S1, S2] : normal S1, S2 [Clear Lung Fields] : clear lung fields [Good Air Entry] : good air entry [Soft] : abdomen soft [Non Tender] : non-tender [Normal Gait] : normal gait [No Edema] : no edema [No Cyanosis] : no cyanosis [Moves all extremities] : moves all extremities [Normal Conjunctiva] : normal conjunctiva [Normal Venous Pressure] : normal venous pressure [No Carotid Bruit] : no carotid bruit [No Murmur] : no murmur [No Rub] : no rub [S4] : S4 [No Rash] : no rash [Normal Speech] : normal speech [Alert and Oriented] : alert and oriented [Normal memory] : normal memory

## 2021-08-09 NOTE — HISTORY OF PRESENT ILLNESS
[FreeTextEntry1] : 57yo F hx of anxiety, palpitation and MS presented with cc of RIVERS and palpitation. Patient reported recent weight gain 20lb since Covid pandemic started one year ago. Patient reported worsening exercise tolerance. Patient also reported persistent palpitations almost weekly. She does endorse associated anxiety requiring Ativan use at night.\par \par No CP.\par \par GFR 83\par .

## 2021-08-09 NOTE — ASSESSMENT
[FreeTextEntry1] : 55yo F hx of anxiety and MS presented with cc of RIVERS and palpitation.\par \par \par - Will check stress echo to r/o cardiac etiology for RIVERS\par - Holter monitor placed in the office today for 3 days. Instruction given to patient.\par - F/u after the tests.\par \par Anton Royal MD\par

## 2021-08-10 ENCOUNTER — RESULT CHARGE (OUTPATIENT)
Age: 56
End: 2021-08-10

## 2021-08-10 ENCOUNTER — OUTPATIENT (OUTPATIENT)
Dept: OUTPATIENT SERVICES | Facility: HOSPITAL | Age: 56
LOS: 1 days | Discharge: HOME | End: 2021-08-10
Payer: MEDICARE

## 2021-08-10 VITALS
HEIGHT: 66 IN | OXYGEN SATURATION: 98 % | SYSTOLIC BLOOD PRESSURE: 114 MMHG | DIASTOLIC BLOOD PRESSURE: 72 MMHG | WEIGHT: 147.93 LBS | RESPIRATION RATE: 17 BRPM | HEART RATE: 58 BPM | TEMPERATURE: 97 F

## 2021-08-10 DIAGNOSIS — Z98.82 BREAST IMPLANT STATUS: Chronic | ICD-10-CM

## 2021-08-10 DIAGNOSIS — Z01.818 ENCOUNTER FOR OTHER PREPROCEDURAL EXAMINATION: ICD-10-CM

## 2021-08-10 DIAGNOSIS — G35 MULTIPLE SCLEROSIS: ICD-10-CM

## 2021-08-10 DIAGNOSIS — Z90.49 ACQUIRED ABSENCE OF OTHER SPECIFIED PARTS OF DIGESTIVE TRACT: Chronic | ICD-10-CM

## 2021-08-10 LAB
ALBUMIN SERPL ELPH-MCNC: 4.5 G/DL — SIGNIFICANT CHANGE UP (ref 3.5–5.2)
ALP SERPL-CCNC: 55 U/L — SIGNIFICANT CHANGE UP (ref 30–115)
ALT FLD-CCNC: 14 U/L — SIGNIFICANT CHANGE UP (ref 0–41)
ANION GAP SERPL CALC-SCNC: 9 MMOL/L — SIGNIFICANT CHANGE UP (ref 7–14)
APTT BLD: 31.4 SEC — SIGNIFICANT CHANGE UP (ref 27–39.2)
AST SERPL-CCNC: 18 U/L — SIGNIFICANT CHANGE UP (ref 0–41)
BASOPHILS # BLD AUTO: 0.04 K/UL — SIGNIFICANT CHANGE UP (ref 0–0.2)
BASOPHILS NFR BLD AUTO: 0.7 % — SIGNIFICANT CHANGE UP (ref 0–1)
BILIRUB SERPL-MCNC: 0.3 MG/DL — SIGNIFICANT CHANGE UP (ref 0.2–1.2)
BUN SERPL-MCNC: 19 MG/DL — SIGNIFICANT CHANGE UP (ref 10–20)
CALCIUM SERPL-MCNC: 9.4 MG/DL — SIGNIFICANT CHANGE UP (ref 8.5–10.1)
CHLORIDE SERPL-SCNC: 103 MMOL/L — SIGNIFICANT CHANGE UP (ref 98–110)
CO2 SERPL-SCNC: 28 MMOL/L — SIGNIFICANT CHANGE UP (ref 17–32)
CREAT SERPL-MCNC: 0.7 MG/DL — SIGNIFICANT CHANGE UP (ref 0.7–1.5)
EOSINOPHIL # BLD AUTO: 0.14 K/UL — SIGNIFICANT CHANGE UP (ref 0–0.7)
EOSINOPHIL NFR BLD AUTO: 2.6 % — SIGNIFICANT CHANGE UP (ref 0–8)
GLUCOSE SERPL-MCNC: 92 MG/DL — SIGNIFICANT CHANGE UP (ref 70–99)
HCT VFR BLD CALC: 39.8 % — SIGNIFICANT CHANGE UP (ref 37–47)
HGB BLD-MCNC: 13 G/DL — SIGNIFICANT CHANGE UP (ref 12–16)
IMM GRANULOCYTES NFR BLD AUTO: 0.2 % — SIGNIFICANT CHANGE UP (ref 0.1–0.3)
INR BLD: 1.05 RATIO — SIGNIFICANT CHANGE UP (ref 0.65–1.3)
LYMPHOCYTES # BLD AUTO: 1.58 K/UL — SIGNIFICANT CHANGE UP (ref 1.2–3.4)
LYMPHOCYTES # BLD AUTO: 29.4 % — SIGNIFICANT CHANGE UP (ref 20.5–51.1)
MCHC RBC-ENTMCNC: 29.5 PG — SIGNIFICANT CHANGE UP (ref 27–31)
MCHC RBC-ENTMCNC: 32.7 G/DL — SIGNIFICANT CHANGE UP (ref 32–37)
MCV RBC AUTO: 90.2 FL — SIGNIFICANT CHANGE UP (ref 81–99)
MONOCYTES # BLD AUTO: 0.44 K/UL — SIGNIFICANT CHANGE UP (ref 0.1–0.6)
MONOCYTES NFR BLD AUTO: 8.2 % — SIGNIFICANT CHANGE UP (ref 1.7–9.3)
NEUTROPHILS # BLD AUTO: 3.17 K/UL — SIGNIFICANT CHANGE UP (ref 1.4–6.5)
NEUTROPHILS NFR BLD AUTO: 58.9 % — SIGNIFICANT CHANGE UP (ref 42.2–75.2)
NRBC # BLD: 0 /100 WBCS — SIGNIFICANT CHANGE UP (ref 0–0)
PLATELET # BLD AUTO: 216 K/UL — SIGNIFICANT CHANGE UP (ref 130–400)
POTASSIUM SERPL-MCNC: 4.1 MMOL/L — SIGNIFICANT CHANGE UP (ref 3.5–5)
POTASSIUM SERPL-SCNC: 4.1 MMOL/L — SIGNIFICANT CHANGE UP (ref 3.5–5)
PROT SERPL-MCNC: 6.6 G/DL — SIGNIFICANT CHANGE UP (ref 6–8)
PROTHROM AB SERPL-ACNC: 12.1 SEC — SIGNIFICANT CHANGE UP (ref 9.95–12.87)
RBC # BLD: 4.41 M/UL — SIGNIFICANT CHANGE UP (ref 4.2–5.4)
RBC # FLD: 12.8 % — SIGNIFICANT CHANGE UP (ref 11.5–14.5)
SODIUM SERPL-SCNC: 140 MMOL/L — SIGNIFICANT CHANGE UP (ref 135–146)
WBC # BLD: 5.38 K/UL — SIGNIFICANT CHANGE UP (ref 4.8–10.8)
WBC # FLD AUTO: 5.38 K/UL — SIGNIFICANT CHANGE UP (ref 4.8–10.8)

## 2021-08-10 PROCEDURE — 93010 ELECTROCARDIOGRAM REPORT: CPT

## 2021-08-10 RX ORDER — PRUCALOPRIDE 2 MG/1
1 TABLET, FILM COATED ORAL
Qty: 0 | Refills: 0 | DISCHARGE

## 2021-08-10 RX ORDER — GLATIRAMER ACETATE 20 MG/ML
0 INJECTION, SOLUTION SUBCUTANEOUS
Qty: 0 | Refills: 0 | DISCHARGE

## 2021-08-10 RX ORDER — GLATIRAMER ACETATE 20 MG/ML
1 INJECTION, SOLUTION SUBCUTANEOUS
Qty: 0 | Refills: 0 | DISCHARGE

## 2021-08-10 RX ORDER — LEVOTHYROXINE SODIUM 125 MCG
1 TABLET ORAL
Qty: 0 | Refills: 0 | DISCHARGE

## 2021-08-10 NOTE — H&P PST ADULT - BREASTS
Anesthesia Pre Eval Note    Anesthesia ROS/Med Hx        Anesthetic Complication History:  Patient does not have a history of anesthetic complications      Pulmonary Review:  Patient does not have a pulmonary history      Neuro/Psych Review:  Patient does not have a neuro/psych history       Cardiovascular Review:  Patient does not have a cardiovascular history       GI/HEPATIC/RENAL Review:  Patient does not have a GI/hepatic/renalhistory       End/Other Review:  Patient does not have an endo/other history        Relevant Problems   No relevant active problems       Physical Exam     Airway   Mallampati: I      Anesthesia Plan    ASA Status: 2  Anesthesia Type: General        
not examined

## 2021-08-10 NOTE — H&P PST ADULT - NSICDXPASTSURGICALHX_GEN_ALL_CORE_FT
PAST SURGICAL HISTORY:  H/O bilateral breast implants     History of appendectomy     History of cholecystectomy

## 2021-08-10 NOTE — H&P PST ADULT - NSICDXFAMILYHX_GEN_ALL_CORE_FT
FAMILY HISTORY:  Father  Still living? No  FH: colon cancer, Age at diagnosis: Age Unknown    Mother  Still living? Yes, Estimated age: Age Unknown  FH: kidney cancer, Age at diagnosis: Age Unknown

## 2021-08-10 NOTE — H&P PST ADULT - REASON FOR ADMISSION
55 Y/O FEMALE HERE FOR PRE-ADMISSION SURGICAL TESTING. PATIENT REPORTS SHE HAS A H/O MS DIAGNOSED IN 2006. HAVING MORE SPAMS TO B/L LE AND FINGERS. HAVING MIGRAINES. NEEDS MRI OF THE BRAIN AND C-SPINE. GETS ANXIETY WHILE DOING MRI'S.  NOW FOR SCHEDULED PROCEDURE.

## 2021-08-10 NOTE — H&P PST ADULT - HISTORY OF PRESENT ILLNESS
PATIENT DENIES CHEST PAIN, SHORTNESS OF BREATH, PALPITATIONS, COUGHING, FEVER, DYSURIA.  CAN WALK UP 2-3 FLIGHTS OF STEPS WITHOUT SOB.    NO COUGH, FEVER, SORE THROAT, HEADACHE, LOSS OF TASTE OR SMELL. NO KNOWN EXPOSURE TO ANYONE WITH COVID. PATIENT WAS INSTRUCTED TO ISOLATE FROM NOW UNTIL THE SURGERY.  FULLY VACCINATED    Anesthesia Alert  NO--Difficult Airway  NO--History of neck surgery or radiation  NO--Limited ROM of neck  NO--History of Malignant hyperthermia  NO--Personal or family history of Pseudocholinesterase deficiency  NO--Prior Anesthesia Complication  NO--Latex Allergy  NO--Loose teeth  NO--History of Rheumatoid Arthritis  NO--RELL  NO-bleeding risk

## 2021-08-17 PROBLEM — K58.9 IRRITABLE BOWEL SYNDROME, UNSPECIFIED: Chronic | Status: ACTIVE | Noted: 2021-08-10

## 2021-08-17 PROBLEM — F41.9 ANXIETY DISORDER, UNSPECIFIED: Chronic | Status: ACTIVE | Noted: 2021-08-10

## 2021-08-17 PROBLEM — K58.9 IRRITABLE BOWEL SYNDROME WITHOUT DIARRHEA: Chronic | Status: ACTIVE | Noted: 2021-08-10

## 2021-08-17 PROBLEM — E03.9 HYPOTHYROIDISM, UNSPECIFIED: Chronic | Status: ACTIVE | Noted: 2021-08-10

## 2021-08-27 ENCOUNTER — OUTPATIENT (OUTPATIENT)
Dept: OUTPATIENT SERVICES | Facility: HOSPITAL | Age: 56
LOS: 1 days | Discharge: HOME | End: 2021-08-27

## 2021-08-27 ENCOUNTER — LABORATORY RESULT (OUTPATIENT)
Age: 56
End: 2021-08-27

## 2021-08-27 DIAGNOSIS — Z90.49 ACQUIRED ABSENCE OF OTHER SPECIFIED PARTS OF DIGESTIVE TRACT: Chronic | ICD-10-CM

## 2021-08-27 DIAGNOSIS — Z11.59 ENCOUNTER FOR SCREENING FOR OTHER VIRAL DISEASES: ICD-10-CM

## 2021-08-27 DIAGNOSIS — Z98.82 BREAST IMPLANT STATUS: Chronic | ICD-10-CM

## 2021-08-31 ENCOUNTER — RESULT REVIEW (OUTPATIENT)
Age: 56
End: 2021-08-31

## 2021-08-31 ENCOUNTER — OUTPATIENT (OUTPATIENT)
Dept: OUTPATIENT SERVICES | Facility: HOSPITAL | Age: 56
LOS: 1 days | Discharge: HOME | End: 2021-08-31
Payer: MEDICARE

## 2021-08-31 DIAGNOSIS — Z90.49 ACQUIRED ABSENCE OF OTHER SPECIFIED PARTS OF DIGESTIVE TRACT: Chronic | ICD-10-CM

## 2021-08-31 DIAGNOSIS — G35 MULTIPLE SCLEROSIS: ICD-10-CM

## 2021-08-31 DIAGNOSIS — Z98.82 BREAST IMPLANT STATUS: Chronic | ICD-10-CM

## 2021-08-31 PROCEDURE — 70551 MRI BRAIN STEM W/O DYE: CPT | Mod: 26

## 2021-08-31 PROCEDURE — 72141 MRI NECK SPINE W/O DYE: CPT | Mod: 26

## 2021-08-31 NOTE — PRE-ANESTHESIA EVALUATION ADULT - NSANTHPMHFT_GEN_ALL_CORE
57 y/o F with pmh of MS, anxiety, hypothyroid who presents for MRI brain + c-spine for recent dysphagia.

## 2021-08-31 NOTE — CHART NOTE - NSCHARTNOTEFT_GEN_A_CORE
PACU ANESTHESIA ADMISSION NOTE      Procedure: MRI brain + c-spine      ____  Intubated  TV:______       Rate: ______      FiO2: ______    __x__  Patent Airway    __x__  Full return of protective reflexes    __x__  Full recovery from anesthesia / back to baseline status    Vitals:  T(C): --  HR: --  BP: --  RR: --  SpO2: --    Mental Status:  __x__ Awake   ___x__ Alert   _____ Drowsy   _____ Sedated    Nausea/Vomiting:  __x__ NO  ______Yes,   See Post - Op Orders          Pain Scale (0-10):  ___0__    Treatment: ____ None    __x__ See Post - Op/PCA Orders    Post - Operative Fluids:   ____ Oral   __x__ See Post - Op Orders    Plan: Discharge:   __x__Home       _____Floor     _____Critical Care    _____  Other:_________________    Comments: Patient had smooth intraoperative event, no anesthesia complication.  PACU Vital signs: HR:   67         BP:    121    /  64        RR: 18             O2 Sat:    96   %     Temp 97

## 2021-09-01 ENCOUNTER — APPOINTMENT (OUTPATIENT)
Dept: CARDIOLOGY | Facility: CLINIC | Age: 56
End: 2021-09-01

## 2021-10-18 ENCOUNTER — APPOINTMENT (OUTPATIENT)
Dept: CARDIOLOGY | Facility: CLINIC | Age: 56
End: 2021-10-18
Payer: MEDICARE

## 2021-10-18 PROCEDURE — 93325 DOPPLER ECHO COLOR FLOW MAPG: CPT

## 2021-10-18 PROCEDURE — ZZZZZ: CPT

## 2021-10-18 PROCEDURE — 93320 DOPPLER ECHO COMPLETE: CPT

## 2021-10-18 PROCEDURE — 93351 STRESS TTE COMPLETE: CPT

## 2021-12-03 DIAGNOSIS — M54.12 RADICULOPATHY, CERVICAL REGION: ICD-10-CM

## 2022-01-26 ENCOUNTER — APPOINTMENT (OUTPATIENT)
Dept: SURGERY | Facility: CLINIC | Age: 57
End: 2022-01-26
Payer: MEDICARE

## 2022-01-26 VITALS
HEART RATE: 65 BPM | DIASTOLIC BLOOD PRESSURE: 60 MMHG | SYSTOLIC BLOOD PRESSURE: 120 MMHG | TEMPERATURE: 97.2 F | BODY MASS INDEX: 23.78 KG/M2 | HEIGHT: 66 IN | WEIGHT: 148 LBS

## 2022-01-26 PROCEDURE — 46600 DIAGNOSTIC ANOSCOPY SPX: CPT

## 2022-01-26 PROCEDURE — 99203 OFFICE O/P NEW LOW 30 MIN: CPT

## 2022-02-01 NOTE — DATA REVIEWED
[FreeTextEntry1] :  \par Dec 27, 2021 \par \par MAMMO TOMOSYNTHESIS SCREENING WITH IMPLANTS BILATERAL, US BREAST\par Clinical Breast Exam: Patient does report clinical breast exam in the last year.\par \par Clinical Indication: Routine screening. No family history of breast cancer.\par Compared to: 11/03/2020, 09/16/2019, 09/13/2018, and 06/26/2017\par \par  \par \par MAMMOGRAM: \par \par Tomosynthesis and 2D imaging of the breast(s) were performed.  Current study was also evaluated with a computer aided detection (CAD) system.\par Breast density: There are scattered areas of fibroglandular density.\par \par No significant masses, calcifications, or other findings are seen in either breast. \par \par Bilateral subpectoral breast implants are intact and stable\par Mammo BI-RADS 1: NEGATIVE\par US BREAST COMPLETE BILATERAL\par Ultrasound evaluation was performed including examination of all four quadrants of the breast(s) and the retroareolar region(s).\par No suspicious abnormalities were seen sonographically in either breast.\par \par Ultrasound BI-RADS 1: NEGATIVE\par \par  \par \par  \par \par Electronic Signature: I personally reviewed the images and agree with this report. Final Report: Dictated by  and Signed by Attending Daniela Coley MD 12/27/2021 12:25 PM\par \par  \par \par OVERALL IMPRESSION: OVERALL BI-RADS 1: NEGATIVE\par \par  \par \par There is no mammographic or sonographic evidence of malignancy.\par \par  \par \par A 1 year screening mammogram and ultrasound of both breast(s) is recommended. The patient will be sent a normal letter.\par \par   \par

## 2022-02-01 NOTE — ASSESSMENT
[FreeTextEntry1] : Vika is a 56 postmenopausal F with right breast pain. \par \par ON exam, there was no suspicious abnormalities palpated in either breast.  THere was no signs of infection and no signs of capsular contracture or implant rupture on exam either. \par \par \par Her imaging is as follows:\par 12/27/21 b/l mammo and US:\par -scattered areas of fibroglandular density\par -Bilateral subpectoral breast implants are intact and stable\par Mammo/Ultrasound BI-RADS 1\par \par She will be due for her next screening mammogram and US on 12/28/22.  This will be scheduled for her today.  I will have her follow up after for a CBE. \par \par In regards to her breast pain, it may be related to fibrocystic changes within her breast that are hormonally influenced. We spoke about possible interventions including evening primrose oil, supportive bras, and decreasing caffeine intake.  Although none of these have been consistently proven to improve breast pain, they may be tried.  If the pain becomes very severe, there have been studies of tamoxifen being effective for the treatment of breast pain, although there are risks with tamoxifen.  Her pain may have been incited with the estrogen tablet, although it is unclear how much of this gets absorbed systemically.  I have recommended that she also try and stop the tablet and see if her symptoms resolve as she is not gaining much benefit from the treatment.  She is agreeable.  \par \par We discussed dense breasts.  Increasing breast density has been found to increase ones risk of breast cancer, but at this time, there is no clear indication for additional imaging in this setting, as both US and MRI have not been found to improve survival.  One can consider bilateral screening US.  However, out of 1000 women screened, the use of routine US will only identify an additional 3-5 cancers.  The use of US was found to increase the likelihood of undergoing more imaging and more biopsies.  She does NOT have dense breasts.  We have decided NOT to proceed with screening bilateral breast US at this time.\par \par She otherwise is interested in exchanging her implants/breast mammaplasty and a referral for plastic surgery was made (Dr. Olmstead).\par \par All of her questions were answered.  She knows to call with any further questions or concerns. \par \par PLAN: \par -plastic surgery referral \par -b/l screening mammogram in 12/28/22., follow up after

## 2022-02-01 NOTE — HISTORY OF PRESENT ILLNESS
[FreeTextEntry1] : iVka is a 55 F who presents with right breast pain. \par \par She has had right breast pain in the retroareolar area for the past month.  When she first developed her pain, she was seen by her physician who thought that she had mastitis and she was given a 1 week course of Keflex antibiotics.  She did not have any changes to her pain with the antibiotics.  She denies any redness or induration in that area.  She has not palpated any abnormal masses in either breast and denies any nipple discharge or retraction.  She denies any trauma to her breast and does not think that her implants have ruptured. \par \par Of note, she had her saline implants placed in  and has not had any issues since getting them placed. \par \par She stopped HRT 1.5 yrs prior, but has been on other treatments to treat her menopausal symptoms including weight gain, hot flashes and vaginal dryness.  Most recently, she started an estrogen tablet insertion 4 weeks prior with minimal improvement in her symptoms. \par \par Her most recent imaging was a b/l screening mammogram and US on 19 which revealed no suspicious abnormalities in either breast, deemed BIRADS 1. \par \par HISTORICAL RISK FACTORS: \par -no prior breast biopsies or surgeries \par -no family history of breast or ovarian cancer \par -, age at first live birth was 25\par -prior OCP use in past x 1 year, stopped 30 years prior \par -was on HRT x 1 year, stopped 1.5 years prior  \par -no gyn surgeries\par \par INTERVAL HISTORY: \par Vika returns for a 1 year follow up for breast pain.  She continues to have right breast pain in her superior areolar area and feels like a nodularity there. \par \par Her gynecologist continues to prescribe her antibiotics for the pain, however there is no change to her pain when taking the antibiotics.  \par \par SHe otherwise denies any left sided breast complaints and denies any nipple discharge or retraction.  She is still interested in removing her breast implants.  \par \par Her most recent imaging was a b/l dx mammogram and US on 11/3/2020 which was unrevealing for any suspicious abnormalities, deemed BIRADS 1. \par \par \par INTERVAL HISTORY 22\par Vika returns for a 1 year follow up for breast pain.  She continues to have right breast pain in her superior areolar area and feels like a nodularity there. \par \par SHe otherwise denies any left sided breast complaints and denies any nipple discharge or retraction.  She is still interested in removing her breast implants.  \par \par Her imaging is as follows:\par 21 b/l mammo and US:\par -scattered areas of fibroglandular density\par -Bilateral subpectoral breast implants are intact and stable\par Mammo/Ultrasound BI-RADS 1

## 2022-02-05 NOTE — HISTORY OF PRESENT ILLNESS
[FreeTextEntry1] : Patient is a 56F with PMH of hypothyroidism, MS(2006) who presents for evaluation of constipation and  bleeding per rectum.  Patient states she has significant constipation with BM 1-2x per week. She reports intermittent bleeding. Patient denies fevers, chills, nausea, vomiting, abdominal pain, diarrhea or unexpected weight loss.  Patient has a family history of colon cancer in her father diagnosed in his 50s. Patient's [facility] colonoscopy was 2/2021 with Dr. Conn which showed hemorrhoids.

## 2022-02-05 NOTE — PROCEDURE
[FreeTextEntry1] : ANGEL and anoscopy show normal sphincter tone, large internal hemorrhoids and no masses.\par

## 2022-02-05 NOTE — ASSESSMENT
[FreeTextEntry1] : 56F with constipation and bleeding grade I hemorrhoids\par \par I discussed the pathology of constipation and bleeding grade I hemorrhoids. We will start by treating her constipation.  I recommended a high fiber diet, psyllium husk fiber, colace and miralax.  She can use senna as needed.  We will see her back in 2 months.

## 2022-02-05 NOTE — PHYSICAL EXAM
[Abdomen Masses] : No abdominal masses [Abdomen Tenderness] : ~T No ~M abdominal tenderness [No HSM] : no hepatosplenomegaly [Excoriation] : no perianal excoriation [Fistula] : no fistulas [Wart] : no warts [Ulcer ___ cm] : no ulcers [Pilonidal Cyst] : no pilonidal cysts [Nonprolapsing] : a nonprolapsing (grade I) [Tender, Swollen] : nontender, non-swollen [Thrombosed] : that was not thrombosed [Skin Tags] : residual hemorrhoidal skin tags were noted [Normal] : was normal [None] : there was no rectal mass  [Respiratory Effort] : normal respiratory effort [Normal Rate and Rhythm] : normal rate and rhythm [de-identified] : external exam shows skin tags [de-identified] : awake, alert and in no acute distress

## 2022-02-08 ENCOUNTER — APPOINTMENT (OUTPATIENT)
Dept: BREAST CENTER | Facility: CLINIC | Age: 57
End: 2022-02-08

## 2022-03-09 ENCOUNTER — APPOINTMENT (OUTPATIENT)
Dept: NEUROLOGY | Facility: CLINIC | Age: 57
End: 2022-03-09

## 2022-03-21 ENCOUNTER — APPOINTMENT (OUTPATIENT)
Dept: NEUROLOGY | Facility: CLINIC | Age: 57
End: 2022-03-21

## 2022-03-23 ENCOUNTER — APPOINTMENT (OUTPATIENT)
Dept: BREAST CENTER | Facility: CLINIC | Age: 57
End: 2022-03-23

## 2022-03-30 ENCOUNTER — APPOINTMENT (OUTPATIENT)
Dept: SURGERY | Facility: CLINIC | Age: 57
End: 2022-03-30
Payer: MEDICARE

## 2022-03-30 VITALS
HEART RATE: 57 BPM | HEIGHT: 66 IN | OXYGEN SATURATION: 96 % | DIASTOLIC BLOOD PRESSURE: 62 MMHG | TEMPERATURE: 97.2 F | SYSTOLIC BLOOD PRESSURE: 112 MMHG | BODY MASS INDEX: 23.3 KG/M2 | WEIGHT: 145 LBS

## 2022-03-30 DIAGNOSIS — K62.5 HEMORRHAGE OF ANUS AND RECTUM: ICD-10-CM

## 2022-03-30 DIAGNOSIS — K64.0 FIRST DEGREE HEMORRHOIDS: ICD-10-CM

## 2022-03-30 PROCEDURE — 46600 DIAGNOSTIC ANOSCOPY SPX: CPT

## 2022-04-09 PROBLEM — K64.0 GRADE I HEMORRHOIDS: Status: ACTIVE | Noted: 2022-02-05

## 2022-04-09 PROBLEM — K62.5 BLEEDING PER RECTUM: Status: ACTIVE | Noted: 2022-02-05

## 2022-04-09 NOTE — ASSESSMENT
[FreeTextEntry1] : 56F with grade I hemorrhoids\par \par I discussed the pathology of bleeding grade I hemorrhoids.  I recommended a course of conservative management including a high fiber diet, fiber supplement, increased water intake and hydrocortisone.  She will return in 3 months.\par

## 2022-04-09 NOTE — PHYSICAL EXAM
[Abdomen Masses] : No abdominal masses [Abdomen Tenderness] : ~T No ~M abdominal tenderness [No HSM] : no hepatosplenomegaly [Excoriation] : no perianal excoriation [Fistula] : no fistulas [Wart] : no warts [Ulcer ___ cm] : no ulcers [Pilonidal Cyst] : no pilonidal cysts [Nonprolapsing] : a nonprolapsing (grade I) [Tender, Swollen] : nontender, non-swollen [Thrombosed] : that was not thrombosed [Skin Tags] : there were no residual hemorrhoidal skin tags seen [Normal] : was normal [None] : there was no rectal mass  [Respiratory Effort] : normal respiratory effort [Normal Rate and Rhythm] : normal rate and rhythm [de-identified] : normal external exam [de-identified] : awake, alert and in no acute distress

## 2022-04-09 NOTE — HISTORY OF PRESENT ILLNESS
[FreeTextEntry1] : Patient is a 56F with PMH of hypothyroidism, MS who present for bleeding per rectum.  Patient has had small amounts of intermittent bleeding for months.  She has 1-2 BM daily. Patient denies fevers, chills, nausea, vomiting, abdominal pain, constipation, diarrhea or unexpected weight loss.  Patient has a family history of colon cancer in her father diagnosed at age 59. Last colonoscopy was in 2/2021 with Dr. Conn and was normal.

## 2022-04-09 NOTE — PROCEDURE
[FreeTextEntry1] : ANGEL and anoscopy show normal sphincter tone, large grade I internal hemorrhoids and no masses.\par

## 2022-09-23 ENCOUNTER — APPOINTMENT (OUTPATIENT)
Dept: CARDIOLOGY | Facility: CLINIC | Age: 57
End: 2022-09-23

## 2022-09-26 ENCOUNTER — APPOINTMENT (OUTPATIENT)
Dept: PLASTIC SURGERY | Facility: CLINIC | Age: 57
End: 2022-09-26

## 2022-09-26 VITALS — WEIGHT: 150 LBS | BODY MASS INDEX: 24.11 KG/M2 | HEIGHT: 66 IN

## 2022-09-26 DIAGNOSIS — Z87.891 PERSONAL HISTORY OF NICOTINE DEPENDENCE: ICD-10-CM

## 2022-09-26 PROCEDURE — 99203 OFFICE O/P NEW LOW 30 MIN: CPT

## 2022-09-26 NOTE — ASSESSMENT
[FreeTextEntry1] : 58 yo F with PMHx of MS and hypothyroidism s/p BL saline implant breast augmentation with asymmetry and capsular contracture. \par \par -Recommend 1st saline implant deflation (permanent, performed in-office) and period of observation (at least 3 months), then 2nd stage implant removal and capsulectomy +/- mastopexy.\par -Pt uncertain about desired post-op breast volume, deflation of saline implant will permit native breast volume and degree of breast ptosis\par -I had a detailed discussion regarding the procedure and reviewed the benefits, risks, and outcomes. \par -The risks include but are not limited to bleeding, infection, seroma, hematoma, wound separation or poor wound healing, tissue ischemia/necrosis, ongoing NAC numbness, scarring in particular hypertrophic or keloid scarring, breast asymmetry, need for additional surgery, loss of NAC sensation, and  dissatisfaction with outcome.\par -Reviewed risk of keloids scarring.\par -I reviewed with the patient the location of incisional scars, possible use of surgical drain, need to wear surgical support bra post-operatively, and no post-operative heavy lifting.\par -Patient to consider her options\par -Follow up PRN, pt to bring implant info\par \par Due to COVID-19, pre-visit patient instructions were explained to the patient and their symptoms were checked upon arrival. Masks were used by the healthcare provider and staff and the examination room was cleaned after the patient visit concluded\par

## 2022-09-26 NOTE — REASON FOR VISIT
[Initial Evaluation] : an initial evaluation [Other: _____] : [unfilled] [FreeTextEntry1] : Dr. Rebecca Ortega

## 2022-09-26 NOTE — PHYSICAL EXAM
[Bra Size: _______] : Bra Size: [unfilled] [de-identified] : well developed female, NAD [de-identified] : NC/AT [de-identified] : supple [de-identified] : unlabored breathing  [de-identified] : CAMIR [de-identified] : Bilateral breasts symmetrical and Grade 1 ptosis, with widened areola\par Left breast: no palpable masses, nipple retraction or discharge, grade 3 capsular contracture, implant in submuscular plane, superior fullness, and capsule tethering to lower pole noted on animation, well-healed Wise pattern scar\par Right breast: no palpable masses, nipple retraction or discharge, implant in submuscular plane, grade 2 capsular contracture, well-healed Wise pattern scar\par Axilla: no clinically palpable lymph adenopathy bilateral\par Chest: no trunk deformities\par Palpable LD muscle with moderate minimal soft tissue bulk, bilateral\par \par Breast measurements (standing; (cm)):\par Regnault ptosis grade: n/a\par R SN-Nipple: 26.5\par R Nipple-IMF: 13.5\par R Base width: 14\par R Nipple - midsternum: 7.5\par R NAC diameter: 6.8\par \par IMF position asymmetrical, right 1 cm lower than left breast\par \par L SN-Nipple: 26.5\par L Nipple-IMF: 11.5\par L Base width: 14\par L Nipple - midsternum: 8\par L NAC diameter: 7.7\par  [de-identified] : soft, nontender  [de-identified] : FROM

## 2022-09-26 NOTE — HISTORY OF PRESENT ILLNESS
[FreeTextEntry1] : 56 yo F  with PMHx of MS, Hypothyroidism and Anxiety who presents today for evaluation of bilateral breast implants. Patient underwent b/l breast augmentation with smooth saline implants in  with revision surgery for correction of "double bubble" later that year with Jo-pattern mastopexy. Patient presents today c/o bilateral breast pain R>>>L for several years and getting worse, now constant with breast asymmetry.  Pt sleeps in a tight sports bra to alleviate discomfort and wears one constantly.  She is also c/o neck and shoulder pain due to heavy implants and right mastalgia with RUE ROM. \par Denies any nipple discharge, inversion or skin changes. \par \par Denies any personal or family h/o Breast Ca. Recent mammogram this year, unremarkable, per patient. \par \par Occupation - retired  at [employer]\par Former smoker, quit\par Breast fed both kids\par \par Current bra size: Wears a 36DD (allergan ~360 or 375 saline implant as per pt recall)\par Pre-Aug size: 36B cup

## 2022-09-28 ENCOUNTER — APPOINTMENT (OUTPATIENT)
Dept: NEUROLOGY | Facility: CLINIC | Age: 57
End: 2022-09-28

## 2022-12-01 ENCOUNTER — APPOINTMENT (OUTPATIENT)
Dept: BREAST CENTER | Facility: CLINIC | Age: 57
End: 2022-12-01

## 2022-12-01 DIAGNOSIS — N64.4 MASTODYNIA: ICD-10-CM

## 2022-12-01 PROCEDURE — 99214 OFFICE O/P EST MOD 30 MIN: CPT

## 2022-12-01 NOTE — HISTORY OF PRESENT ILLNESS
[FreeTextEntry1] : Patient is a 57F who presents with right breast pain.\par \par \par HISTORICAL RISK FACTORS: \par -no prior breast biopsies or surgeries \par -no family history of breast or ovarian cancer \par -, age at first live birth was 25\par -prior OCP use in past x 1 year, stopped 30 years prior \par -was on HRT x 1 year, stopped 1.5 years prior  \par -no gyn surgeries\par \par \par Her most recent imaging is:\par 21: B mmg and US --> BIRADS 1\par Scattered density with bilateral subpec implants\par \par \par She has had right breast pain in the retroareolar area for the past few years. She has a history of implant placement in  with subsequent revision. She has recently seen Dr. Olmstead for asymmetry and capsular contraction. The recommendation was saline implant deflation, followed by implant removal and capsulectomy

## 2022-12-01 NOTE — ASSESSMENT
[FreeTextEntry1] : Patient is a 57F who has a history of bilateral breast augmentation with smooth saline in 2002 with revision with wise pattern mastopexy.  She states she has been having breast pain on and off for the past few years (R>L).  She recently saw plastics (Dr. Olmstead) for capsular contracture and was recommended implant deflation, followed by a period of observation and then implant removal/capsulectomy.  Her most recent imaging is mmg/us 12/2021 that was BIRADS 1.  Her PE is unremarkable.  I had a discussion with this patient regarding diagnostic results, impressions, recommendations, risks and benefits. I discussed the nature of her breast pain. I explained to her that her breast tissue responds to the fluctuations in hormone levels which may cause pain. She could consider Vit E 400IU twice daily and to eliminate caffeine from her diet. I explained to her that in some studies, patients reported relief with those measures. I also advised her to try using sports bra for more support.  In addition, she is due for her breast imaging and should obtain it now.  Patient was also interested in breast MRI.  It was explained that MRI is usually used in high risk screening. The use of MRIs have not been shown to prolong survival, however out of 1000 women screened, an additional 14-15 cancers will be identified. The use of MRIs, has, however, been shown to increase the number of procedures and additional imaging because although it is a very sensitive test, it is not as specific. I also discussed that some patients could have an allergic reaction to gadolinium, or affect the kidneys, and gadolinium has been found to be deposited in the brain of patients who undergo many MRIs in their lifetime, although the effects are currently understudy. Patient does not want to proceed with an MRI at this time.  Patient is for bilateral mmg and US now.  She is to decide regarding proceeding with implant deflation.  Will follow up after imaging.  Total time spent on encounter was greater than 30 minutes, which included face to face time with the patient, performing an exam, reviewing previous medical records including imaging/ pathology, documenting in patient record and coordinating care/imaging. Greater than 50% of the encounter was spent on counseling and coordination of her breast issue.

## 2022-12-01 NOTE — PHYSICAL EXAM
[Normocephalic] : normocephalic [EOMI] : extra ocular movement intact [No Supraclavicular Adenopathy] : no supraclavicular adenopathy [No Cervical Adenopathy] : no cervical adenopathy [Examined in the supine and seated position] : examined in the supine and seated position [No dominant masses] : no dominant masses in right breast  [No dominant masses] : no dominant masses left breast [No Nipple Retraction] : no left nipple retraction [No Nipple Discharge] : no left nipple discharge [No Axillary Lymphadenopathy] : no left axillary lymphadenopathy [Soft] : abdomen soft [No Rashes] : no rashes [No Ulceration] : no ulceration [Breast Nipple Inversion] : nipples not inverted [Breast Nipple Retraction] : nipples not retracted [Breast Nipple Flattening] : nipples not flattened [Breast Nipple Fissures] : nipples not fissured [de-identified] : NL respirations

## 2023-01-03 ENCOUNTER — RESULT REVIEW (OUTPATIENT)
Age: 58
End: 2023-01-03

## 2023-01-03 ENCOUNTER — OUTPATIENT (OUTPATIENT)
Dept: OUTPATIENT SERVICES | Facility: HOSPITAL | Age: 58
LOS: 1 days | Discharge: HOME | End: 2023-01-03
Payer: MEDICARE

## 2023-01-03 DIAGNOSIS — N64.4 MASTODYNIA: ICD-10-CM

## 2023-01-03 DIAGNOSIS — Z90.49 ACQUIRED ABSENCE OF OTHER SPECIFIED PARTS OF DIGESTIVE TRACT: Chronic | ICD-10-CM

## 2023-01-03 DIAGNOSIS — N63.10 UNSPECIFIED LUMP IN THE RIGHT BREAST, UNSPECIFIED QUADRANT: ICD-10-CM

## 2023-01-03 DIAGNOSIS — Z98.82 BREAST IMPLANT STATUS: Chronic | ICD-10-CM

## 2023-01-03 PROCEDURE — G0279: CPT | Mod: 26

## 2023-01-03 PROCEDURE — 77066 DX MAMMO INCL CAD BI: CPT | Mod: 26

## 2023-01-03 PROCEDURE — 76642 ULTRASOUND BREAST LIMITED: CPT | Mod: 26,RT

## 2023-03-24 ENCOUNTER — APPOINTMENT (OUTPATIENT)
Dept: NEUROLOGY | Facility: CLINIC | Age: 58
End: 2023-03-24
Payer: MEDICARE

## 2023-03-24 VITALS
WEIGHT: 150 LBS | DIASTOLIC BLOOD PRESSURE: 70 MMHG | HEIGHT: 66 IN | SYSTOLIC BLOOD PRESSURE: 147 MMHG | BODY MASS INDEX: 24.11 KG/M2 | TEMPERATURE: 98 F | HEART RATE: 71 BPM | OXYGEN SATURATION: 98 %

## 2023-03-24 DIAGNOSIS — Z82.0 FAMILY HISTORY OF EPILEPSY AND OTHER DISEASES OF THE NERVOUS SYSTEM: ICD-10-CM

## 2023-03-24 DIAGNOSIS — K58.8 OTHER IRRITABLE BOWEL SYNDROME: ICD-10-CM

## 2023-03-24 DIAGNOSIS — F41.9 ANXIETY DISORDER, UNSPECIFIED: ICD-10-CM

## 2023-03-24 PROCEDURE — 99213 OFFICE O/P EST LOW 20 MIN: CPT

## 2023-03-24 NOTE — ASSESSMENT
[FreeTextEntry1] : Vika is a 57y old female with PMH of MS and hypothyroidism, and anxiety presenting today for spasm and cramps in her legs and arms. No recent MS attacks, and she is taking her Copaxone fairly regularly \par I don't think there is a need to change her DMD, or it is related to her symptoms. In the absence of clinical attacks,  is not going to change the management here. \par Probably she will benefit from treating her anxiety (recently started on Venlafaxine), also Carbamazepine might be helpful for the spasms \par She is taking Vit D 5000 per week, which is probably high dose of the long term, and would suggest to decrease it. \par \par Plan: \par - Continue Copaxone \par - Decrease Vitamine D to 2000 unites per day\par - Refer to psychiatry for her anxiety \par - Carbamazepine 200 mg ER at bedtime  \par

## 2023-03-24 NOTE — PHYSICAL EXAM
[FreeTextEntry1] : MS: Awake, alert, oriented to person, place, situation and time. Follows commands. \par \par Language: Speech is clear, fluent. No dysarthria. \par \par CNs 2 - 12 intact. EOMI no nystagmus, no diplopia. No facial asymmetry b/l. Tongue midline, normal movements, no atrophy. \par \par Motor: Normal muscle bulk & tone. No noticeable tremor or seizure. No pronator drift. Muscle strength of b/l UE and b/l LE 5/5. \par \par Sensation: Intact to LT b/l throughout, mild decrease in vibration sensation in the Rt foot \par \par Cortical: No extinction\par \par Coordination: Intact rapid-alternating movements. No dysmetria to finger to nose. \par \par DTR: 1+ in biceps, brachioradialis and triceps; 2+ in patellar and ankle; plantars are down b/l\par \par Gait: No postural instability. Normal stance and tandem gait.\par \par \par

## 2023-03-24 NOTE — HISTORY OF PRESENT ILLNESS
[FreeTextEntry1] : This 57y female presents today for pain and spasm in both legs. This started since she had a covid infection in the beginning of 2022. This may also affect the hands. \par She states she had Covid 3 times. \par \par She mentioned some blurry vision, but it is not new for her. Sometimes she feels dizzy since that last covid. \par \par Taking her Copaxone daily, but sometimes she forget, she takes about 25 days per month. She is concerned that Copaxone might be the cause of her symptoms \par She takes vit D 5000 per day \par She states her voice change when talk to people, as she is getting tired of talking. This is no difference during the day. It may take 5-10 min to start weaning. \par \par From previous visit: \par \par "Ms. Fraser is a 55-year-old woman last seen by me on 8/5/2019 for follow-up of multiple sclerosis. Her issue back then mostly was related to nausea, vomiting, and headaches. We did an MRI of the brain and cervical spine. MRI of the cervical spine showed very mild minimal radicular changes given some improvement compared to prior MRI. MRI of the brain showed white matter lesions consistent with her diagnosis of MS. She has been intermittent taking her Paxil and sometimes will take everyday sometimes she will miss it up to week or two. She says when she takes it makes her feel sick and achy and then when she stops taking it for a few days she feels much better. She has had no other new complaints other than the headaches, which are occurring between 15-20 days a month and she was using Fioricet, which appears to help; however, she believes this making her also feel more anxious than she usually is. She takes lorazepam p.r.n. for anxiety; however, she had to use more frequently and she believes she is lot more anxious than in past. \par She believes she is depressed and anxious and her family has been noticing some issues with her memory and her language. She has also been having some issues in which she cannot go to the bathroom for sometimes 2 weeks at a time. She is scheduled to have a colonoscopy and I ordered an MRI cervical and thoracic spine to make sure there are no new lesions. She missed her MRI appointment.\par \par She is here today with a new complaint of problems swallowing for the last 2 months. She wakes up with feeling of something stuck in her throat. Sometimes she has difficulty wallowing even yogurt. She also has depression and anxiety and wants to try CBS to see if it helps.\par Migraines have been well controlled recently "

## 2023-03-24 NOTE — END OF VISIT
[] : Resident [Time Spent: ___ minutes] : I have spent [unfilled] minutes of time on the encounter. [FreeTextEntry3] : Patient with painful spasms in b/l LE and hands intermittently\par Also anxiety and depression\par \par Plan as above

## 2023-06-05 ENCOUNTER — NON-APPOINTMENT (OUTPATIENT)
Age: 58
End: 2023-06-05

## 2023-06-15 ENCOUNTER — APPOINTMENT (OUTPATIENT)
Dept: CARDIOLOGY | Facility: CLINIC | Age: 58
End: 2023-06-15
Payer: MEDICARE

## 2023-06-15 VITALS
BODY MASS INDEX: 22.18 KG/M2 | WEIGHT: 138 LBS | DIASTOLIC BLOOD PRESSURE: 62 MMHG | HEIGHT: 66 IN | HEART RATE: 68 BPM | SYSTOLIC BLOOD PRESSURE: 108 MMHG

## 2023-06-15 DIAGNOSIS — Z00.00 ENCOUNTER FOR GENERAL ADULT MEDICAL EXAMINATION W/OUT ABNORMAL FINDINGS: ICD-10-CM

## 2023-06-15 PROCEDURE — 93000 ELECTROCARDIOGRAM COMPLETE: CPT

## 2023-06-15 PROCEDURE — 99214 OFFICE O/P EST MOD 30 MIN: CPT | Mod: 25

## 2023-06-15 RX ORDER — VENLAFAXINE HCL 75 MG
75 TABLET ORAL
Refills: 0 | Status: DISCONTINUED | COMMUNITY
End: 2023-06-15

## 2023-06-15 RX ORDER — CARBAMAZEPINE 200 MG/1
200 CAPSULE, EXTENDED RELEASE ORAL AT BEDTIME
Qty: 90 | Refills: 0 | Status: DISCONTINUED | COMMUNITY
Start: 2023-03-24 | End: 2023-06-15

## 2023-06-15 RX ORDER — HYDROCORTISONE 25 MG/G
2.5 CREAM TOPICAL
Qty: 60 | Refills: 2 | Status: DISCONTINUED | COMMUNITY
Start: 2022-04-04 | End: 2023-06-15

## 2023-06-15 NOTE — ASSESSMENT
[FreeTextEntry1] : 57YOF hx of anxiety and MS \par Episodes of palpitations. \par \par Recommend:\par Fasting blood work ordered with TSH.\par MCOT for 2 weeks.\par F/u after the tests.\par \par Anton Royal MD\par

## 2023-06-15 NOTE — HISTORY OF PRESENT ILLNESS
[FreeTextEntry1] : 56yo F hx of anxiety, palpitation and MS.\par \par Pt presents for a follow up visit since 2021. She still has frequent palpitations at night when she lies down, unsure if related to anxiety. Denies chest pain or SOB. She attends spin class three times a week. \par

## 2023-06-15 NOTE — REVIEW OF SYSTEMS
[Dizziness] : dizziness [Numbness (Hypoesthesia)] : numbness [Weakness] : weakness [Anxiety] : anxiety [Fever] : no fever [Chills] : no chills [Blurry Vision] : no blurred vision [Earache] : no earache [Sore Throat] : no sore throat [Palpitations] : palpitations [Orthopnea] : no orthopnea [Syncope] : no syncope [Cough] : no cough [Wheezing] : no wheezing [Dysuria] : no dysuria [Joint Pain] : no joint pain

## 2023-06-15 NOTE — PHYSICAL EXAM
[Well Developed] : well developed [Normal Conjunctiva] : normal conjunctiva [Normal Venous Pressure] : normal venous pressure [No Carotid Bruit] : no carotid bruit [Normal S1, S2] : normal S1, S2 [No Murmur] : no murmur [No Rub] : no rub [S4] : S4 [Clear Lung Fields] : clear lung fields [Good Air Entry] : good air entry [Soft] : abdomen soft [Non Tender] : non-tender [Normal Gait] : normal gait [No Edema] : no edema [No Cyanosis] : no cyanosis [No Rash] : no rash [Moves all extremities] : moves all extremities [Normal Speech] : normal speech [Alert and Oriented] : alert and oriented [Normal memory] : normal memory

## 2023-06-16 ENCOUNTER — RESULT CHARGE (OUTPATIENT)
Age: 58
End: 2023-06-16

## 2023-09-06 ENCOUNTER — NON-APPOINTMENT (OUTPATIENT)
Age: 58
End: 2023-09-06

## 2023-11-09 ENCOUNTER — APPOINTMENT (OUTPATIENT)
Dept: NEUROLOGY | Facility: CLINIC | Age: 58
End: 2023-11-09

## 2024-01-19 ENCOUNTER — NON-APPOINTMENT (OUTPATIENT)
Age: 59
End: 2024-01-19

## 2024-01-29 ENCOUNTER — RX RENEWAL (OUTPATIENT)
Age: 59
End: 2024-01-29

## 2024-02-13 RX ORDER — GLATIRAMER ACETATE 20 MG/ML
20 INJECTION, SOLUTION SUBCUTANEOUS
Qty: 90 | Refills: 1 | Status: ACTIVE | COMMUNITY
Start: 1900-01-01 | End: 1900-01-01

## 2024-02-20 ENCOUNTER — APPOINTMENT (OUTPATIENT)
Dept: NEUROLOGY | Facility: CLINIC | Age: 59
End: 2024-02-20
Payer: MEDICARE

## 2024-02-20 VITALS
HEART RATE: 65 BPM | WEIGHT: 135 LBS | BODY MASS INDEX: 21.69 KG/M2 | SYSTOLIC BLOOD PRESSURE: 109 MMHG | HEIGHT: 66 IN | DIASTOLIC BLOOD PRESSURE: 67 MMHG

## 2024-02-20 DIAGNOSIS — G35 MULTIPLE SCLEROSIS: ICD-10-CM

## 2024-02-20 DIAGNOSIS — R68.89 OTHER GENERAL SYMPTOMS AND SIGNS: ICD-10-CM

## 2024-02-20 DIAGNOSIS — F32.A DEPRESSION, UNSPECIFIED: ICD-10-CM

## 2024-02-20 PROCEDURE — G2211 COMPLEX E/M VISIT ADD ON: CPT

## 2024-02-20 PROCEDURE — 99214 OFFICE O/P EST MOD 30 MIN: CPT

## 2024-02-20 NOTE — PHYSICAL EXAM
[FreeTextEntry1] : Mental status: Awake, alert and oriented x3. Follows commands. No dysarthria, no aphasia.    Cranial nerves: Pupils equally round and reactive to light, visual fields full, no nystagmus, extraocular muscles intact, V1 through V3 intact bilaterally and symmetric, face symmetric, hearing intact to finger rub, palate elevation symmetric, tongue was midline.  Motor:  MRC grading 5/5 b/l UE/LE, ?LUE 5-/5 proximal.   strength 5/5.  Normal tone and bulk.  No abnormal movements. No fasciculations noted today.  Sensation: Intact to light touch, temperature, and vibration throughout hands, knees, and ankles  Coordination: No dysmetria on finger-to-nose  Reflexes: 2+ in bilateral UE/LE  Gait: Narrow and steady. No ataxia.

## 2024-02-20 NOTE — ASSESSMENT
[FreeTextEntry1] : Ms. FRANCES 58 year old female presents today for a follow up visit. She has a hx of MS no recent relapse currently on Copaxone. No neuro imaging for about 3 years, will follow up with Mr head and C spine w/w/o.   Plan:  -C/w Copaxone -Reports depression, PHQ 11. Offered Lexapro. She would like to discuss options with her PCP and psychiatry. Psych referral placed.  -Forgetfulness likely has a psychiatric overlay, will also eval for secondary causes when she gets MR head, and will do labs, neuropsych referral  -Muscle cramping and spasm, will increase hydration and try tonic water, offered muscle relaxer, will call office if she would like to pursue -No fasciculations on exam, says she can see her muscle cramping and spasms, asked to take a video next time to ensure no fasciculations.  -RTC 3 months

## 2024-02-20 NOTE — REVIEW OF SYSTEMS
[As Noted in HPI] : as noted in HPI [Suicidal] : not suicidal [Anxiety] : anxiety [Depression] : depression [Negative] : Constitutional

## 2024-02-20 NOTE — HISTORY OF PRESENT ILLNESS
[FreeTextEntry1] : Ms. FRANCES 58 year old female presents today for a follow up visit regarding MS. She is currently on Copaxone for MS. She has not had recent MS relapse recently- most recent imaging 2021. At last visit was expressing increase in spasm/leg cramps. Was suspected to have anxiety, referred to psych and Carbamazepine 200 mg ER at night was added. She never started.   Currently, she reports depression. She was taking Effexor she stopped it because she was gaining weight. She is not currently taking anything. She has not seen psychiatry. No thoughts of suicide, she says she has a lot going on at home, she just feels down and anxious all the time. Uses PRN Lorazepam. Wakes up having panic attacks. PHQ score is 11.  Legs and feet feel a lot of cramping- she has had it for many years, no weakness, some tingling in her feet or mouth- this has happened in the past as well. She relates it to the cold weather. She wakes up with spasm. She says she can see the spasms in her legs. Unclear if her description is consistent with fasciculation.  No speech or vision issues.   When dx with MS initial sx included dizziness, inability to walk straight, pain vertigo, tingling, headaches, none of these symptoms recently.  No issues on Copaxone.   Feels forgetful. Daughter repeats herself often, she confuses locations, uses GPS more while driving even in familiar locations (denies getting lost), loses objects. Unsure if it may be due to her stress lately. Reports fam hx of dementia.

## 2024-03-04 ENCOUNTER — APPOINTMENT (OUTPATIENT)
Dept: NEUROLOGY | Facility: CLINIC | Age: 59
End: 2024-03-04

## 2024-04-08 ENCOUNTER — APPOINTMENT (OUTPATIENT)
Dept: CARDIOLOGY | Facility: CLINIC | Age: 59
End: 2024-04-08
Payer: MEDICARE

## 2024-04-08 VITALS
SYSTOLIC BLOOD PRESSURE: 124 MMHG | DIASTOLIC BLOOD PRESSURE: 74 MMHG | HEIGHT: 66 IN | HEART RATE: 69 BPM | BODY MASS INDEX: 20.25 KG/M2 | WEIGHT: 126 LBS

## 2024-04-08 DIAGNOSIS — R06.02 SHORTNESS OF BREATH: ICD-10-CM

## 2024-04-08 DIAGNOSIS — R09.89 OTHER SPECIFIED SYMPTOMS AND SIGNS INVOLVING THE CIRCULATORY AND RESPIRATORY SYSTEMS: ICD-10-CM

## 2024-04-08 PROCEDURE — 93242 EXT ECG>48HR<7D RECORDING: CPT

## 2024-04-08 PROCEDURE — 99214 OFFICE O/P EST MOD 30 MIN: CPT

## 2024-04-08 PROCEDURE — G2211 COMPLEX E/M VISIT ADD ON: CPT

## 2024-04-08 RX ORDER — FLUTICASONE PROPIONATE 50 UG/1
50 SPRAY, METERED NASAL
Qty: 16 | Refills: 0 | Status: ACTIVE | COMMUNITY
Start: 2023-12-19

## 2024-04-08 RX ORDER — ESTRADIOL 0.1 MG/G
0.1 CREAM VAGINAL
Qty: 85 | Refills: 0 | Status: ACTIVE | COMMUNITY
Start: 2024-02-14

## 2024-04-08 NOTE — ASSESSMENT
[FreeTextEntry1] : 58 YOF hx of anxiety and MS.  Episodes of palpitations and throat tightness.  Recommend: Holter monitor for 1 week. 2D ECHO. F/u after the tests.  Anton Royal MD

## 2024-04-08 NOTE — REVIEW OF SYSTEMS
[Palpitations] : palpitations [Dizziness] : dizziness [Numbness (Hypoesthesia)] : numbness [Weakness] : weakness [Anxiety] : anxiety [Fever] : no fever [Chills] : no chills [Blurry Vision] : no blurred vision [Earache] : no earache [Sore Throat] : no sore throat [Orthopnea] : no orthopnea [Syncope] : no syncope [Cough] : no cough [Wheezing] : no wheezing [Dysuria] : no dysuria [Joint Pain] : no joint pain

## 2024-04-08 NOTE — HISTORY OF PRESENT ILLNESS
[FreeTextEntry1] : 58yo F hx of anxiety, palpitation and MS.  Patient c/o increased episodes of palpitations in the last few weeks, described as her heart racing, accompanied by tightness and fluttering in her throat. She denies SOB, LOC.  GFR 88  TSH 1.8 Hb 13.9.

## 2024-04-29 ENCOUNTER — APPOINTMENT (OUTPATIENT)
Dept: CARDIOLOGY | Facility: CLINIC | Age: 59
End: 2024-04-29
Payer: MEDICARE

## 2024-04-29 PROCEDURE — 93306 TTE W/DOPPLER COMPLETE: CPT

## 2024-06-03 ENCOUNTER — APPOINTMENT (OUTPATIENT)
Dept: CARDIOLOGY | Facility: CLINIC | Age: 59
End: 2024-06-03
Payer: MEDICARE

## 2024-06-03 VITALS
WEIGHT: 124 LBS | SYSTOLIC BLOOD PRESSURE: 114 MMHG | HEART RATE: 76 BPM | DIASTOLIC BLOOD PRESSURE: 80 MMHG | HEIGHT: 66 IN | BODY MASS INDEX: 19.93 KG/M2

## 2024-06-03 DIAGNOSIS — I47.19 OTHER SUPRAVENTRICULAR TACHYCARDIA: ICD-10-CM

## 2024-06-03 DIAGNOSIS — R00.2 PALPITATIONS: ICD-10-CM

## 2024-06-03 DIAGNOSIS — E03.9 HYPOTHYROIDISM, UNSPECIFIED: ICD-10-CM

## 2024-06-03 PROCEDURE — 93000 ELECTROCARDIOGRAM COMPLETE: CPT

## 2024-06-03 PROCEDURE — 99213 OFFICE O/P EST LOW 20 MIN: CPT | Mod: 25

## 2024-06-03 RX ORDER — CEPHALEXIN 500 MG/1
500 CAPSULE ORAL
Qty: 21 | Refills: 0 | Status: DISCONTINUED | COMMUNITY
Start: 2023-12-08 | End: 2024-06-03

## 2024-06-04 PROBLEM — R00.2 PALPITATION: Status: ACTIVE | Noted: 2021-08-09

## 2024-06-04 PROBLEM — I47.19 ATRIAL TACHYCARDIA: Status: ACTIVE | Noted: 2024-06-04

## 2024-06-04 PROBLEM — E03.9 ACQUIRED HYPOTHYROIDISM: Status: ACTIVE | Noted: 2021-08-09

## 2024-06-04 RX ORDER — LACTULOSE 10 G/15ML
10 SOLUTION ORAL
Qty: 1350 | Refills: 0 | Status: ACTIVE | COMMUNITY
Start: 2024-04-09

## 2024-06-04 RX ORDER — HYDROCORTISONE 25 MG/G
2.5 CREAM TOPICAL
Qty: 30 | Refills: 0 | Status: DISCONTINUED | COMMUNITY
Start: 2024-04-22

## 2024-06-04 RX ORDER — BACLOFEN 10 MG/1
10 TABLET ORAL
Qty: 30 | Refills: 0 | Status: ACTIVE | COMMUNITY
Start: 2024-04-19

## 2024-06-04 NOTE — ASSESSMENT
[FreeTextEntry1] : 58 YOF hx of anxiety and MS.  Episodes of palpitations, probably due to short runs of AT. Normal LVEF, normal chamber sizes.  Recommend: No additional cardiac evaluation at this time. Patient will schedule f/u TFTs with PCP and discuss lowering the dose of Synthroid.  F/u in 6 months.  Anton Royal MD

## 2024-06-04 NOTE — HISTORY OF PRESENT ILLNESS
[FreeTextEntry1] : 56yo F hx of anxiety, palpitation and MS.  Patient c/o episodes of palpitations. She denies CP, SOB, LOC.  Holter monitor: SR, PACs, PVCs, short runs of AT.  GFR 88  TSH 1.8 Hb 13.9.

## 2024-06-25 ENCOUNTER — EMERGENCY (EMERGENCY)
Facility: HOSPITAL | Age: 59
LOS: 0 days | Discharge: HOME | End: 2024-06-25
Attending: EMERGENCY MEDICINE
Payer: MEDICARE

## 2024-06-25 VITALS
DIASTOLIC BLOOD PRESSURE: 66 MMHG | RESPIRATION RATE: 18 BRPM | TEMPERATURE: 98 F | HEART RATE: 76 BPM | HEIGHT: 66 IN | OXYGEN SATURATION: 99 % | WEIGHT: 130.07 LBS | SYSTOLIC BLOOD PRESSURE: 143 MMHG

## 2024-06-25 DIAGNOSIS — K56.41 FECAL IMPACTION: ICD-10-CM

## 2024-06-25 DIAGNOSIS — Z90.49 ACQUIRED ABSENCE OF OTHER SPECIFIED PARTS OF DIGESTIVE TRACT: Chronic | ICD-10-CM

## 2024-06-25 DIAGNOSIS — K62.89 OTHER SPECIFIED DISEASES OF ANUS AND RECTUM: ICD-10-CM

## 2024-06-25 DIAGNOSIS — G35 MULTIPLE SCLEROSIS: ICD-10-CM

## 2024-06-25 DIAGNOSIS — Z98.82 BREAST IMPLANT STATUS: Chronic | ICD-10-CM

## 2024-06-25 DIAGNOSIS — K59.00 CONSTIPATION, UNSPECIFIED: ICD-10-CM

## 2024-06-25 PROCEDURE — 99282 EMERGENCY DEPT VISIT SF MDM: CPT

## 2024-06-25 PROCEDURE — 99283 EMERGENCY DEPT VISIT LOW MDM: CPT

## 2024-06-25 NOTE — ED PROCEDURE NOTE - CPROC ED FOREIGN BODY DETAIL1
large amount of stool in rectal vault/The area was draped and prepped and the anatomic location of the suspected foreign body was explored in a bloodless field.

## 2024-06-25 NOTE — ED PROCEDURE NOTE - PROCEDURE ADDITIONAL DETAILS
patient with large amount of stool in rectal vault. patient disimpacted with brown stool. no stool in rectal vault after procedure

## 2024-06-25 NOTE — ED PROVIDER NOTE - NSICDXPASTMEDICALHX_GEN_ALL_CORE_FT
PAST MEDICAL HISTORY:  Anxiety     Constipation     Hypothyroidism     IBS (irritable colon syndrome)     MS (multiple sclerosis)

## 2024-06-25 NOTE — ED ADULT TRIAGE NOTE - CHIEF COMPLAINT QUOTE
pt states she's been unable to have a bowel movement x 2 days, tried to remove stool by self with fingers but states "it was hard as rocks"

## 2024-06-25 NOTE — ED ADULT NURSE NOTE - HIV OFFER
[FreeTextEntry1] : Preoperative clearance rcri score is 0 for low risk procedure can stop aspirin seven days prior and resume when allowable \par htn no diuretics may have created LVOT obstruction \par carotid stenosis on atorvastatin per neuro now on aspirin no plavix \par smoking cessation she has stopped smoking she is vaping\par asymetric septal hypertrophy no outflow gradient continue current meds hydration no further work up\par fu in 6 months 
Opt out

## 2024-06-25 NOTE — ED PROVIDER NOTE - PATIENT PORTAL LINK FT
You can access the FollowMyHealth Patient Portal offered by [facility] by registering at the following website: [email or web address]. By joining [facility]’s FollowMyHealth portal, you will also be able to view your health information using other applications (apps) compatible with our system.

## 2024-06-25 NOTE — ED PROVIDER NOTE - CLINICAL SUMMARY MEDICAL DECISION MAKING FREE TEXT BOX
58yF chronci constipation, MS  pw  constipation.  large  fecal load  after disimpaction - enema  with +  stool evacuation. abd soft nonteder no distended  Patient to be discharged from ED in well appearing condition. Any available test results were discussed with and printed  for patient.  Verbal instructions given, including instructions to return to ED immediately for any new, worsening, or concerning symptoms. Limitations of ED work up discussed.  Patient reports understanding of above with capacity and insight. Written discharge instructions additionally given, including follow-up plan with his GI  Dr resendiz

## 2024-06-25 NOTE — ED PROVIDER NOTE - NSFOLLOWUPINSTRUCTIONS_ED_ALL_ED_FT
Fecal Impaction    WHAT YOU NEED TO KNOW:    Fecal impaction is a buildup of hardened bowel movements in your rectum that you cannot pass. Your bowel movements may form one large mass or several smaller masses.    DISCHARGE INSTRUCTIONS:    Medicines: You may need any of the following:     Laxatives are drinks or drink mixtures that help loosen your bowel movement blockage so you can pass it with ease.      Mineral oils are lubricants for your bowels that help soften your bowel movements so they pass smoothly. These may be used if your healthcare provider could not completely remove the blockage.      Take your medicine as directed. Contact your healthcare provider if you think your medicine is not helping or if you have side effects. Tell him of her if you are allergic to any medicine. Keep a list of the medicines, vitamins, and herbs you take. Include the amounts, and when and why you take them. Bring the list or the pill bottles to follow-up visits. Carry your medicine list with you in case of an emergency.    Self-care:     Eat foods that are high in fiber. Healthy high-fiber foods include fruits, vegetables, whole-grain breads, and beans. Ask if you need to be on a special diet and how much fiber you should eat each day.      Drink liquids as directed. You may need to drink 1.5 to 2 liters of water each day. Water will help keep your bowel movements soft and help you pass them with less pain. Ask your healthcare provider how much liquid to drink each day and which liquids are best for you.       Try to pass a bowel movement as soon as you get the urge. This will help prevent more constipation.      Exercise regularly. This will help keep your bowels working well and may help you pass bowel movements more often.      Keep a diary of your bowel movement schedule. Write down the date and time of each bowel movement. Also write down if you had trouble passing it. This will help you know if you are passing bowel movements as you should.    Prevent another fecal impaction:     Set up a regular toileting schedule at the same time every day. Try to pass a bowel movement when you first wake up, or half an hour after you eat. This may help you control your bowel movements and help you know when they will happen.      Take fiber supplements daily as directed. Fiber supplements will help you have a regular bowel movement schedule.      Use enemas, laxatives, or constipation medicines as directed. Ask your healthcare provider how often to take medicine when you begin to feel constipated.    Follow up with your healthcare provider as directed: Write down your questions so you remember to ask them during your visits.     Contact your healthcare provider if:     You are not able to pass a bowel movement, even after treatment, or you pass fewer than 3 bowel movements in a week.      You feel lightheaded, dizzy, or faint.       You have nausea, vomiting, or diarrhea that will not stop.      You have a fever.       You have questions or concerns about your condition or care.    Return to the emergency department if:     You have swelling in your abdomen.      You have bloody bowel movements.       You bleed from your rectum.       You have severe pain.          © Copyright [employer] 2020

## 2024-06-25 NOTE — ED PROVIDER NOTE - PHYSICAL EXAMINATION
general: well appearing, no distress  eyes: clear conjunctiva  abdomen: no CVA tenderness, BS x 4, non tender, no distention, no rashes, no rebound or guarding, fecal impaction with brown stool and external hemorrhoids   msk: pelvis stable, gait steady  skin: no rashes, swelling, bruising

## 2024-06-25 NOTE — ED ADULT NURSE NOTE - NSHOSCREENINGQ1_ED_ALL_ED
No Pt reports good appetite at the moment but when pain increases, appetite occasionally varies. PO intake 100% of either [facility] meals or meals from home brought in by wife d/t pt dislikes with hospital meals. States he follows Low Na diet at home so will discuss reccs with LIP. No further nutrition intervention at this time.

## 2024-08-01 ENCOUNTER — RX RENEWAL (OUTPATIENT)
Age: 59
End: 2024-08-01

## 2024-08-28 ENCOUNTER — NON-APPOINTMENT (OUTPATIENT)
Age: 59
End: 2024-08-28

## 2024-10-24 ENCOUNTER — NON-APPOINTMENT (OUTPATIENT)
Age: 59
End: 2024-10-24

## 2024-11-07 ENCOUNTER — OUTPATIENT (OUTPATIENT)
Dept: OUTPATIENT SERVICES | Facility: HOSPITAL | Age: 59
LOS: 1 days | End: 2024-11-07
Payer: MEDICARE

## 2024-11-07 VITALS
RESPIRATION RATE: 18 BRPM | HEIGHT: 66 IN | TEMPERATURE: 98 F | SYSTOLIC BLOOD PRESSURE: 112 MMHG | HEART RATE: 78 BPM | OXYGEN SATURATION: 98 % | DIASTOLIC BLOOD PRESSURE: 65 MMHG | WEIGHT: 130.07 LBS

## 2024-11-07 DIAGNOSIS — G35 MULTIPLE SCLEROSIS: ICD-10-CM

## 2024-11-07 DIAGNOSIS — Z90.49 ACQUIRED ABSENCE OF OTHER SPECIFIED PARTS OF DIGESTIVE TRACT: Chronic | ICD-10-CM

## 2024-11-07 DIAGNOSIS — Z98.82 BREAST IMPLANT STATUS: Chronic | ICD-10-CM

## 2024-11-07 DIAGNOSIS — Z01.818 ENCOUNTER FOR OTHER PREPROCEDURAL EXAMINATION: ICD-10-CM

## 2024-11-07 LAB
ALBUMIN SERPL ELPH-MCNC: 4.3 G/DL — SIGNIFICANT CHANGE UP (ref 3.5–5.2)
ALP SERPL-CCNC: 51 U/L — SIGNIFICANT CHANGE UP (ref 30–115)
ALT FLD-CCNC: 56 U/L — HIGH (ref 0–41)
ANION GAP SERPL CALC-SCNC: 7 MMOL/L — SIGNIFICANT CHANGE UP (ref 7–14)
APTT BLD: 30.2 SEC — SIGNIFICANT CHANGE UP (ref 27–39.2)
AST SERPL-CCNC: 28 U/L — SIGNIFICANT CHANGE UP (ref 0–41)
BASOPHILS # BLD AUTO: 0.04 K/UL — SIGNIFICANT CHANGE UP (ref 0–0.2)
BASOPHILS NFR BLD AUTO: 0.6 % — SIGNIFICANT CHANGE UP (ref 0–1)
BILIRUB SERPL-MCNC: <0.2 MG/DL — SIGNIFICANT CHANGE UP (ref 0.2–1.2)
BUN SERPL-MCNC: 25 MG/DL — HIGH (ref 10–20)
CALCIUM SERPL-MCNC: 9.5 MG/DL — SIGNIFICANT CHANGE UP (ref 8.4–10.5)
CHLORIDE SERPL-SCNC: 104 MMOL/L — SIGNIFICANT CHANGE UP (ref 98–110)
CO2 SERPL-SCNC: 31 MMOL/L — SIGNIFICANT CHANGE UP (ref 17–32)
CREAT SERPL-MCNC: 0.8 MG/DL — SIGNIFICANT CHANGE UP (ref 0.7–1.5)
EGFR: 84 ML/MIN/1.73M2 — SIGNIFICANT CHANGE UP
EOSINOPHIL # BLD AUTO: 0.1 K/UL — SIGNIFICANT CHANGE UP (ref 0–0.7)
EOSINOPHIL NFR BLD AUTO: 1.4 % — SIGNIFICANT CHANGE UP (ref 0–8)
GLUCOSE SERPL-MCNC: 90 MG/DL — SIGNIFICANT CHANGE UP (ref 70–99)
HCT VFR BLD CALC: 40 % — SIGNIFICANT CHANGE UP (ref 37–47)
HGB BLD-MCNC: 13.4 G/DL — SIGNIFICANT CHANGE UP (ref 12–16)
IMM GRANULOCYTES NFR BLD AUTO: 0.1 % — SIGNIFICANT CHANGE UP (ref 0.1–0.3)
INR BLD: 0.95 RATIO — SIGNIFICANT CHANGE UP (ref 0.65–1.3)
LYMPHOCYTES # BLD AUTO: 1.61 K/UL — SIGNIFICANT CHANGE UP (ref 1.2–3.4)
LYMPHOCYTES # BLD AUTO: 22.5 % — SIGNIFICANT CHANGE UP (ref 20.5–51.1)
MCHC RBC-ENTMCNC: 31.7 PG — HIGH (ref 27–31)
MCHC RBC-ENTMCNC: 33.5 G/DL — SIGNIFICANT CHANGE UP (ref 32–37)
MCV RBC AUTO: 94.6 FL — SIGNIFICANT CHANGE UP (ref 81–99)
MONOCYTES # BLD AUTO: 0.52 K/UL — SIGNIFICANT CHANGE UP (ref 0.1–0.6)
MONOCYTES NFR BLD AUTO: 7.3 % — SIGNIFICANT CHANGE UP (ref 1.7–9.3)
NEUTROPHILS # BLD AUTO: 4.89 K/UL — SIGNIFICANT CHANGE UP (ref 1.4–6.5)
NEUTROPHILS NFR BLD AUTO: 68.1 % — SIGNIFICANT CHANGE UP (ref 42.2–75.2)
NRBC # BLD: 0 /100 WBCS — SIGNIFICANT CHANGE UP (ref 0–0)
PLATELET # BLD AUTO: 220 K/UL — SIGNIFICANT CHANGE UP (ref 130–400)
PMV BLD: 11.7 FL — HIGH (ref 7.4–10.4)
POTASSIUM SERPL-MCNC: 4 MMOL/L — SIGNIFICANT CHANGE UP (ref 3.5–5)
POTASSIUM SERPL-SCNC: 4 MMOL/L — SIGNIFICANT CHANGE UP (ref 3.5–5)
PROT SERPL-MCNC: 6.6 G/DL — SIGNIFICANT CHANGE UP (ref 6–8)
PROTHROM AB SERPL-ACNC: 11.2 SEC — SIGNIFICANT CHANGE UP (ref 9.95–12.87)
RBC # BLD: 4.23 M/UL — SIGNIFICANT CHANGE UP (ref 4.2–5.4)
RBC # FLD: 13.3 % — SIGNIFICANT CHANGE UP (ref 11.5–14.5)
SODIUM SERPL-SCNC: 142 MMOL/L — SIGNIFICANT CHANGE UP (ref 135–146)
WBC # BLD: 7.17 K/UL — SIGNIFICANT CHANGE UP (ref 4.8–10.8)
WBC # FLD AUTO: 7.17 K/UL — SIGNIFICANT CHANGE UP (ref 4.8–10.8)

## 2024-11-07 PROCEDURE — 85610 PROTHROMBIN TIME: CPT

## 2024-11-07 PROCEDURE — 85025 COMPLETE CBC W/AUTO DIFF WBC: CPT

## 2024-11-07 PROCEDURE — 80053 COMPREHEN METABOLIC PANEL: CPT

## 2024-11-07 PROCEDURE — 85730 THROMBOPLASTIN TIME PARTIAL: CPT

## 2024-11-07 PROCEDURE — 99214 OFFICE O/P EST MOD 30 MIN: CPT | Mod: 25

## 2024-11-07 PROCEDURE — 36415 COLL VENOUS BLD VENIPUNCTURE: CPT

## 2024-11-07 NOTE — H&P PST ADULT - HISTORY OF PRESENT ILLNESS
pt with ms  having issues w face, hands and feet  now for planned procedure     PATIENT/GUARDIAN CURRENTLY DENIES CHEST PAIN  SHORTNESS OF BREATH  PALPITATIONS,  DYSURIA, OR UPPER RESPIRATORY INFECTION IN PAST 2 WEEKS  denies travel outside the [place] in the past 30 days    Anesthesia Alert  NO--Difficult Airway  NO--History of neck surgery or radiation  NO--Limited ROM of neck  NO--History of Malignant hyperthermia  NO--No personal or family history of Pseudocholinesterase deficiency.  NO--Prior Anesthesia Complication  NO--Latex Allergy  NO--Loose teeth  NO--History of Rheumatoid Arthritis  NO--Bleeding risk  NO--RELL  NO--Other_____    PT/GUARDIAN DENIES ANY RASHES, ABRASION, OR OPEN WOUNDS OR CUTS    AS PER THE PT/GUARDIAN, THIS IS HIS/HER COMPLETE MEDICAL AND SURGICAL HX, INCLUDING MEDICATIONS PRESCRIBED AND OVER THE COUNTER    Patient/guardian understands the instructions and was given the opportunity to ask questions and have them answered.    pt/guardian denies any suicidal ideation or thoughts, pt states not a threat to self or others      Duke Activity Status Index (DASI)    58.2 points  The higher the score (maximum 58.2), the higher the functional status.  9.89 METs    Revised Cardiac Risk Index for Pre-Operative Risk  0 points  Class I Risk  3.9 %  30-day risk of death, MI, or cardiac arrest    Multiple sclerosis    Encounter for other preprocedural examination    No pertinent family history in first degree relatives    FH: colon cancer (Father)    FH: kidney cancer (Mother)    MS (multiple sclerosis)    Constipation    Hypothyroidism    Anxiety    IBS (irritable colon syndrome)    History of appendectomy    History of cholecystectomy    H/O bilateral breast implants    SysAdmin_VisitLink

## 2024-11-07 NOTE — H&P PST ADULT - REASON FOR ADMISSION
Patient is a 59  year old  female presenting to [facility] in preparation for   SEDATION MRI BRAIN W/WO MRI CSP W/WO on   11/14/24 under general anesthesia by Dr. ferrari

## 2024-11-07 NOTE — H&P PST ADULT - ALCOHOL USE HISTORY SINGLE SELECT
"Marta from [facility] [place] [facility] relayed message regarding Anticoagulation management:    \"Dr. Ybarra has a closed practice due to being at capacity, at this time Kacie would need to establish with another provider at that location before we can take her on for warfarin management.       Other options include [facility] managing warfarin and sending lab orders to [place] for INR checks in the mean time until Kacie can formally transition care to [facility].\"      Marielle Hermosillo, PharmD BCACP    Marta verbalized understanding. Writer called patient to help patient scheduled appt with Provider at [facility]--no answer. LVM requesting call back. Marta asked for INR clinic to call back to inform if patient will establish care at [facility] in [place] or not.   Marta: [phone]      Thanks! Gayla Blanton RN    " never

## 2024-11-08 DIAGNOSIS — Z01.818 ENCOUNTER FOR OTHER PREPROCEDURAL EXAMINATION: ICD-10-CM

## 2024-11-08 DIAGNOSIS — G35 MULTIPLE SCLEROSIS: ICD-10-CM

## 2024-11-14 ENCOUNTER — RESULT REVIEW (OUTPATIENT)
Age: 59
End: 2024-11-14

## 2024-11-14 ENCOUNTER — OUTPATIENT (OUTPATIENT)
Dept: OUTPATIENT SERVICES | Facility: HOSPITAL | Age: 59
LOS: 1 days | End: 2024-11-14
Payer: MEDICARE

## 2024-11-14 VITALS
DIASTOLIC BLOOD PRESSURE: 61 MMHG | HEART RATE: 72 BPM | SYSTOLIC BLOOD PRESSURE: 151 MMHG | OXYGEN SATURATION: 98 % | TEMPERATURE: 97 F | WEIGHT: 130.07 LBS | RESPIRATION RATE: 18 BRPM

## 2024-11-14 VITALS
DIASTOLIC BLOOD PRESSURE: 59 MMHG | SYSTOLIC BLOOD PRESSURE: 119 MMHG | OXYGEN SATURATION: 100 % | RESPIRATION RATE: 17 BRPM | HEART RATE: 65 BPM | TEMPERATURE: 98 F

## 2024-11-14 DIAGNOSIS — Z90.49 ACQUIRED ABSENCE OF OTHER SPECIFIED PARTS OF DIGESTIVE TRACT: Chronic | ICD-10-CM

## 2024-11-14 DIAGNOSIS — Z98.82 BREAST IMPLANT STATUS: Chronic | ICD-10-CM

## 2024-11-14 DIAGNOSIS — G35 MULTIPLE SCLEROSIS: ICD-10-CM

## 2024-11-14 PROCEDURE — 70553 MRI BRAIN STEM W/O & W/DYE: CPT | Mod: 26

## 2024-11-14 PROCEDURE — 72156 MRI NECK SPINE W/O & W/DYE: CPT

## 2024-11-14 PROCEDURE — A9579: CPT

## 2024-11-14 PROCEDURE — 72156 MRI NECK SPINE W/O & W/DYE: CPT | Mod: 26

## 2024-11-14 PROCEDURE — 70553 MRI BRAIN STEM W/O & W/DYE: CPT

## 2024-11-14 RX ORDER — SEMAGLUTIDE 1.34 MG/ML
2.4 INJECTION, SOLUTION SUBCUTANEOUS
Refills: 0 | DISCHARGE

## 2024-11-14 RX ORDER — DENOSUMAB 120 MG/1.7ML
60 INJECTION SUBCUTANEOUS
Refills: 0 | DISCHARGE

## 2024-11-14 NOTE — ASU PATIENT PROFILE, ADULT - FALL HARM RISK - UNIVERSAL INTERVENTIONS
Bed in lowest position, wheels locked, appropriate side rails in place/Call bell, personal items and telephone in reach/Instruct patient to call for assistance before getting out of bed or chair/Non-slip footwear when patient is out of bed/[place] to call system/Physically safe environment - no spills, clutter or unnecessary equipment/Purposeful Proactive Rounding/Room/bathroom lighting operational, light cord in reach

## 2024-11-15 DIAGNOSIS — G35 MULTIPLE SCLEROSIS: ICD-10-CM

## 2024-12-31 ENCOUNTER — NON-APPOINTMENT (OUTPATIENT)
Age: 59
End: 2024-12-31

## 2025-01-31 ENCOUNTER — NON-APPOINTMENT (OUTPATIENT)
Age: 60
End: 2025-01-31

## 2025-03-12 ENCOUNTER — APPOINTMENT (OUTPATIENT)
Dept: NEUROPSYCHOLOGY | Facility: CLINIC | Age: 60
End: 2025-03-12

## 2025-04-07 ENCOUNTER — APPOINTMENT (OUTPATIENT)
Dept: NEUROPSYCHOLOGY | Facility: CLINIC | Age: 60
End: 2025-04-07

## 2025-04-07 ENCOUNTER — APPOINTMENT (OUTPATIENT)
Dept: CARDIOLOGY | Facility: CLINIC | Age: 60
End: 2025-04-07

## 2025-04-07 VITALS
WEIGHT: 135 LBS | SYSTOLIC BLOOD PRESSURE: 110 MMHG | HEIGHT: 66 IN | HEART RATE: 61 BPM | BODY MASS INDEX: 21.69 KG/M2 | DIASTOLIC BLOOD PRESSURE: 80 MMHG

## 2025-04-07 DIAGNOSIS — E78.5 HYPERLIPIDEMIA, UNSPECIFIED: ICD-10-CM

## 2025-04-07 DIAGNOSIS — I47.19 OTHER SUPRAVENTRICULAR TACHYCARDIA: ICD-10-CM

## 2025-04-07 DIAGNOSIS — F41.9 ANXIETY DISORDER, UNSPECIFIED: ICD-10-CM

## 2025-04-07 DIAGNOSIS — R00.2 PALPITATIONS: ICD-10-CM

## 2025-04-07 PROCEDURE — 93000 ELECTROCARDIOGRAM COMPLETE: CPT

## 2025-04-07 PROCEDURE — 99214 OFFICE O/P EST MOD 30 MIN: CPT | Mod: 25

## 2025-04-07 RX ORDER — LEVOTHYROXINE SODIUM 175 UG/1
175 TABLET ORAL
Qty: 90 | Refills: 0 | Status: ACTIVE | COMMUNITY
Start: 2025-03-27

## 2025-05-21 ENCOUNTER — APPOINTMENT (OUTPATIENT)
Dept: SURGERY | Facility: CLINIC | Age: 60
End: 2025-05-21
Payer: MEDICARE

## 2025-05-21 VITALS
DIASTOLIC BLOOD PRESSURE: 68 MMHG | BODY MASS INDEX: 20.89 KG/M2 | TEMPERATURE: 97 F | HEIGHT: 66 IN | WEIGHT: 130 LBS | SYSTOLIC BLOOD PRESSURE: 104 MMHG

## 2025-05-21 DIAGNOSIS — R10.30 LOWER ABDOMINAL PAIN, UNSPECIFIED: ICD-10-CM

## 2025-05-21 DIAGNOSIS — K29.00 ACUTE GASTRITIS W/OUT BLEEDING: ICD-10-CM

## 2025-05-21 PROCEDURE — 99214 OFFICE O/P EST MOD 30 MIN: CPT

## 2025-05-21 RX ORDER — PANTOPRAZOLE 40 MG/1
40 TABLET, DELAYED RELEASE ORAL DAILY
Qty: 30 | Refills: 0 | Status: ACTIVE | COMMUNITY
Start: 2025-05-21 | End: 1900-01-01

## 2025-06-04 ENCOUNTER — APPOINTMENT (OUTPATIENT)
Dept: SURGERY | Facility: CLINIC | Age: 60
End: 2025-06-04

## 2025-06-17 ENCOUNTER — APPOINTMENT (OUTPATIENT)
Dept: SURGERY | Facility: CLINIC | Age: 60
End: 2025-06-17
Payer: MEDICARE

## 2025-06-17 VITALS
TEMPERATURE: 97 F | WEIGHT: 130 LBS | DIASTOLIC BLOOD PRESSURE: 72 MMHG | HEART RATE: 80 BPM | BODY MASS INDEX: 20.89 KG/M2 | OXYGEN SATURATION: 98 % | HEIGHT: 66 IN | SYSTOLIC BLOOD PRESSURE: 118 MMHG

## 2025-06-17 PROCEDURE — 99205 OFFICE O/P NEW HI 60 MIN: CPT

## 2025-06-19 ENCOUNTER — APPOINTMENT (OUTPATIENT)
Dept: GYNECOLOGIC ONCOLOGY | Facility: CLINIC | Age: 60
End: 2025-06-19

## 2025-06-26 NOTE — ED PROVIDER NOTE - ATTENDING APP SHARED VISIT CONTRIBUTION OF CARE
[facility] provides services at a reduced cost to those who are determined to be eligible through [employer]’s financial assistance program. Information regarding [facility]’s financial assistance program can be found by going to [email or web address] or by calling [phone]. I reviewed and verified the documentation and  and independently performed the documented  MDM.

## 2025-07-02 ENCOUNTER — APPOINTMENT (OUTPATIENT)
Dept: SURGERY | Facility: CLINIC | Age: 60
End: 2025-07-02
Payer: MEDICARE

## 2025-07-02 VITALS
WEIGHT: 130 LBS | HEIGHT: 66 IN | HEART RATE: 85 BPM | SYSTOLIC BLOOD PRESSURE: 112 MMHG | DIASTOLIC BLOOD PRESSURE: 70 MMHG | BODY MASS INDEX: 20.89 KG/M2 | OXYGEN SATURATION: 98 %

## 2025-07-02 PROCEDURE — 99213 OFFICE O/P EST LOW 20 MIN: CPT

## 2025-07-13 ENCOUNTER — NON-APPOINTMENT (OUTPATIENT)
Age: 60
End: 2025-07-13

## 2025-08-09 ENCOUNTER — OUTPATIENT (OUTPATIENT)
Dept: OUTPATIENT SERVICES | Facility: HOSPITAL | Age: 60
LOS: 1 days | End: 2025-08-09
Payer: MEDICARE

## 2025-08-09 DIAGNOSIS — R92.2 INCONCLUSIVE MAMMOGRAM: ICD-10-CM

## 2025-08-09 DIAGNOSIS — Z90.49 ACQUIRED ABSENCE OF OTHER SPECIFIED PARTS OF DIGESTIVE TRACT: Chronic | ICD-10-CM

## 2025-08-09 DIAGNOSIS — Z98.82 BREAST IMPLANT STATUS: Chronic | ICD-10-CM

## 2025-08-09 DIAGNOSIS — Z12.31 ENCOUNTER FOR SCREENING MAMMOGRAM FOR MALIGNANT NEOPLASM OF BREAST: ICD-10-CM

## 2025-08-09 PROCEDURE — 77067 SCR MAMMO BI INCL CAD: CPT | Mod: 26

## 2025-08-09 PROCEDURE — 77063 BREAST TOMOSYNTHESIS BI: CPT | Mod: 26

## 2025-08-09 PROCEDURE — 77067 SCR MAMMO BI INCL CAD: CPT

## 2025-08-09 PROCEDURE — 77063 BREAST TOMOSYNTHESIS BI: CPT

## 2025-08-09 PROCEDURE — 76641 ULTRASOUND BREAST COMPLETE: CPT | Mod: 26,50

## 2025-08-09 PROCEDURE — 76641 ULTRASOUND BREAST COMPLETE: CPT | Mod: 50

## 2025-08-10 DIAGNOSIS — R92.2 INCONCLUSIVE MAMMOGRAM: ICD-10-CM

## 2025-08-10 DIAGNOSIS — Z12.31 ENCOUNTER FOR SCREENING MAMMOGRAM FOR MALIGNANT NEOPLASM OF BREAST: ICD-10-CM
